# Patient Record
Sex: FEMALE | Race: WHITE | HISPANIC OR LATINO | Employment: FULL TIME | URBAN - METROPOLITAN AREA
[De-identification: names, ages, dates, MRNs, and addresses within clinical notes are randomized per-mention and may not be internally consistent; named-entity substitution may affect disease eponyms.]

---

## 2018-08-30 ENCOUNTER — APPOINTMENT (OUTPATIENT)
Dept: OTHER | Facility: IMAGING CENTER | Age: 28
End: 2018-08-30

## 2018-09-06 ENCOUNTER — HOSPITAL ENCOUNTER (INPATIENT)
Facility: MEDICAL CENTER | Age: 28
LOS: 3 days | End: 2018-09-09
Attending: OBSTETRICS & GYNECOLOGY | Admitting: OBSTETRICS & GYNECOLOGY
Payer: COMMERCIAL

## 2018-09-06 DIAGNOSIS — G89.18 POST-OP PAIN: ICD-10-CM

## 2018-09-06 LAB
BASOPHILS # BLD AUTO: 0.4 % (ref 0–1.8)
BASOPHILS # BLD: 0.02 K/UL (ref 0–0.12)
CRYSTALS AMN MICRO: NORMAL
EOSINOPHIL # BLD AUTO: 0.09 K/UL (ref 0–0.51)
EOSINOPHIL NFR BLD: 1.6 % (ref 0–6.9)
ERYTHROCYTE [DISTWIDTH] IN BLOOD BY AUTOMATED COUNT: 44.8 FL (ref 35.9–50)
HCT VFR BLD AUTO: 40.4 % (ref 37–47)
HGB BLD-MCNC: 14 G/DL (ref 12–16)
HOLDING TUBE BB 8507: NORMAL
IMM GRANULOCYTES # BLD AUTO: 0.01 K/UL (ref 0–0.11)
IMM GRANULOCYTES NFR BLD AUTO: 0.2 % (ref 0–0.9)
LYMPHOCYTES # BLD AUTO: 1.41 K/UL (ref 1–4.8)
LYMPHOCYTES NFR BLD: 24.7 % (ref 22–41)
MCH RBC QN AUTO: 31.5 PG (ref 27–33)
MCHC RBC AUTO-ENTMCNC: 34.7 G/DL (ref 33.6–35)
MCV RBC AUTO: 90.8 FL (ref 81.4–97.8)
MONOCYTES # BLD AUTO: 0.46 K/UL (ref 0–0.85)
MONOCYTES NFR BLD AUTO: 8.1 % (ref 0–13.4)
NEUTROPHILS # BLD AUTO: 3.72 K/UL (ref 2–7.15)
NEUTROPHILS NFR BLD: 65 % (ref 44–72)
NRBC # BLD AUTO: 0 K/UL
NRBC BLD-RTO: 0 /100 WBC
PLATELET # BLD AUTO: 155 K/UL (ref 164–446)
PMV BLD AUTO: 11.5 FL (ref 9–12.9)
RBC # BLD AUTO: 4.45 M/UL (ref 4.2–5.4)
WBC # BLD AUTO: 5.7 K/UL (ref 4.8–10.8)

## 2018-09-06 PROCEDURE — 700111 HCHG RX REV CODE 636 W/ 250 OVERRIDE (IP)

## 2018-09-06 PROCEDURE — 89060 EXAM SYNOVIAL FLUID CRYSTALS: CPT

## 2018-09-06 PROCEDURE — 700105 HCHG RX REV CODE 258

## 2018-09-06 PROCEDURE — 85025 COMPLETE CBC W/AUTO DIFF WBC: CPT

## 2018-09-06 PROCEDURE — 700101 HCHG RX REV CODE 250: Performed by: OBSTETRICS & GYNECOLOGY

## 2018-09-06 PROCEDURE — 770002 HCHG ROOM/CARE - OB PRIVATE (112)

## 2018-09-06 RX ORDER — SODIUM CHLORIDE, SODIUM LACTATE, POTASSIUM CHLORIDE, CALCIUM CHLORIDE 600; 310; 30; 20 MG/100ML; MG/100ML; MG/100ML; MG/100ML
INJECTION, SOLUTION INTRAVENOUS
Status: COMPLETED
Start: 2018-09-06 | End: 2018-09-07

## 2018-09-06 RX ORDER — SODIUM CHLORIDE, SODIUM LACTATE, POTASSIUM CHLORIDE, CALCIUM CHLORIDE 600; 310; 30; 20 MG/100ML; MG/100ML; MG/100ML; MG/100ML
INJECTION, SOLUTION INTRAVENOUS
Status: COMPLETED
Start: 2018-09-06 | End: 2018-09-06

## 2018-09-06 RX ORDER — SODIUM CHLORIDE, SODIUM LACTATE, POTASSIUM CHLORIDE, CALCIUM CHLORIDE 600; 310; 30; 20 MG/100ML; MG/100ML; MG/100ML; MG/100ML
INJECTION, SOLUTION INTRAVENOUS CONTINUOUS
Status: DISPENSED | OUTPATIENT
Start: 2018-09-06 | End: 2018-09-07

## 2018-09-06 RX ORDER — MISOPROSTOL 200 UG/1
800 TABLET ORAL
Status: DISCONTINUED | OUTPATIENT
Start: 2018-09-06 | End: 2018-09-07 | Stop reason: HOSPADM

## 2018-09-06 RX ADMIN — Medication 2 MILLI-UNITS/MIN: at 17:47

## 2018-09-06 RX ADMIN — DINOPROSTONE 5 MG: 20 SUPPOSITORY VAGINAL at 11:45

## 2018-09-06 RX ADMIN — SODIUM CHLORIDE, POTASSIUM CHLORIDE, SODIUM LACTATE AND CALCIUM CHLORIDE: 600; 310; 30; 20 INJECTION, SOLUTION INTRAVENOUS at 17:45

## 2018-09-06 ASSESSMENT — PATIENT HEALTH QUESTIONNAIRE - PHQ9
1. LITTLE INTEREST OR PLEASURE IN DOING THINGS: NOT AT ALL
2. FEELING DOWN, DEPRESSED, IRRITABLE, OR HOPELESS: NOT AT ALL
SUM OF ALL RESPONSES TO PHQ9 QUESTIONS 1 AND 2: 0

## 2018-09-06 ASSESSMENT — LIFESTYLE VARIABLES
ALCOHOL_USE: NO
EVER_SMOKED: NEVER

## 2018-09-06 NOTE — PROGRESS NOTES
1500: Report received from Kaity FERNANDEZ. POC discussed, assumed care.   1505: pt laying In bed, POC discussed, taught pt about monitoring. VS taken, WDL. Questions answered.

## 2018-09-06 NOTE — PROGRESS NOTES
EDC- , EGA- 39.2    1000- Pt arrived to L&D with c/o LOF X2 days, clear.  Reports some spotting of old dark brown blood, reports +FM.  SSE preformed, pooling of clear fluid noted.  Fern test collected and sent to lab.  SVE- 1/thick/-2.  1045- Report to Dr. Blas via phone.  Orders received to admit pt for labor.  1105- Report to REBECCA Lawrence.

## 2018-09-06 NOTE — H&P
"History and Physical      Sveta Coronel is a 28 y.o. female   at 38weeks 4days came with     Subjective:   positive  For negative   positive Feelsnegative   positive for LOF for 2 days   negative for vaginal bleeding.   positive for fetal movement    ROS: A comprehensive review of systems was negative.    No past medical history on file.  No past surgical history on file.  OB History    Para Term  AB Living   1             SAB TAB Ectopic Molar Multiple Live Births                    # Outcome Date GA Lbr Louis/2nd Weight Sex Delivery Anes PTL Lv   1 Current                 Social History     Social History   • Marital status: Single     Spouse name: N/A   • Number of children: N/A   • Years of education: N/A     Occupational History   • Not on file.     Social History Main Topics   • Smoking status: Not on file   • Smokeless tobacco: Not on file   • Alcohol use Not on file   • Drug use: Unknown   • Sexual activity: Not on file     Other Topics Concern   • Not on file     Social History Narrative   • No narrative on file     Allergies: Sulfa drugs    Current Facility-Administered Medications:   •  dinoprostone gel (PROSTAGLANDIN) 5 mg, 5 mg, Vaginal, Once, Braeden Blas M.D.    P[facility] with [facility] late transfer of care There are no active problems to display for this patient.              Objective:      Blood pressure 128/86, pulse 98, temperature 36.7 °C (98 °F), temperature source Temporal, height 1.6 m (5' 3\"), weight 69.4 kg (153 lb).    General:   no acute distress, alert, cooperative   Skin:   normal   HEENT:  PERRLA   Lungs:   CTA bilateral   Heart:   S1, S2 normal, no murmur, click, rub or gallop, regular rate and rhythm, brisk carotid upstroke without bruits, peripheral pulses very brisk, chest is clear without rales or wheezing, no pedal edema, no JVD, no hepatosplenomegaly   Abdomen:   gravid, NT   EFW:  3400gms.   Pelvis:  adequate with gynecoid pelvis   FHT:  142 " BPM   Uterine Size: S=D   Presentations: Cephalic   Cervix:     Dilation: 1cm    Effacement: 50%    Station:  -3    Consistency: Firm    Position: Posterior     Lab Review  Lab:     Recent Results (from the past 5880 hour(s))   Ferning if suspected rupture of membranes (ROM)    Collection Time: 09/06/18 10:15 AM   Result Value Ref Range    Fern Test On Amniotic Fluid see below Not present        Assessment:   Sveta Coronel at Unknown  Labor status:not in labor   Obstetrical history significant for There are no active problems to display for this patient.  .      Plan:     Admit to L&D  GBS negative  IOL with prostin gel.

## 2018-09-06 NOTE — PROGRESS NOTES
1115-report received from HUNG Cantrell RN, POC discussed, gel ordered  1145-gel placed, pt educated, verbalized understanding, iv started, labs drawn and sent, admission done  1300-up to walk and eat lunch  1430-back on monitors  1500-report given to JAQUELINE Winchester RN, POC discussed

## 2018-09-07 PROCEDURE — 700111 HCHG RX REV CODE 636 W/ 250 OVERRIDE (IP)

## 2018-09-07 PROCEDURE — 3E0P7VZ INTRODUCTION OF HORMONE INTO FEMALE REPRODUCTIVE, VIA NATURAL OR ARTIFICIAL OPENING: ICD-10-PCS | Performed by: OBSTETRICS & GYNECOLOGY

## 2018-09-07 PROCEDURE — 700102 HCHG RX REV CODE 250 W/ 637 OVERRIDE(OP): Performed by: ANESTHESIOLOGY

## 2018-09-07 PROCEDURE — 305385 HCHG SURGICAL SERVICES 1/4 HOUR: Performed by: OBSTETRICS & GYNECOLOGY

## 2018-09-07 PROCEDURE — 700105 HCHG RX REV CODE 258

## 2018-09-07 PROCEDURE — 700101 HCHG RX REV CODE 250

## 2018-09-07 PROCEDURE — 304964 HCHG RECOVERY ROOM TIME 1HR: Performed by: OBSTETRICS & GYNECOLOGY

## 2018-09-07 PROCEDURE — 700111 HCHG RX REV CODE 636 W/ 250 OVERRIDE (IP): Performed by: OBSTETRICS & GYNECOLOGY

## 2018-09-07 PROCEDURE — 306828 HCHG ANES-TIME GENERAL: Performed by: OBSTETRICS & GYNECOLOGY

## 2018-09-07 PROCEDURE — 700105 HCHG RX REV CODE 258: Performed by: OBSTETRICS & GYNECOLOGY

## 2018-09-07 PROCEDURE — 304966 HCHG RECOVERY SVSC TIME ADDL 1/2 HR: Performed by: OBSTETRICS & GYNECOLOGY

## 2018-09-07 PROCEDURE — 700102 HCHG RX REV CODE 250 W/ 637 OVERRIDE(OP)

## 2018-09-07 PROCEDURE — 303615 HCHG EPIDURAL/SPINAL ANESTHESIA FOR LABOR

## 2018-09-07 PROCEDURE — 59514 CESAREAN DELIVERY ONLY: CPT

## 2018-09-07 PROCEDURE — 700111 HCHG RX REV CODE 636 W/ 250 OVERRIDE (IP): Performed by: ANESTHESIOLOGY

## 2018-09-07 PROCEDURE — A9270 NON-COVERED ITEM OR SERVICE: HCPCS | Performed by: ANESTHESIOLOGY

## 2018-09-07 PROCEDURE — 770002 HCHG ROOM/CARE - OB PRIVATE (112)

## 2018-09-07 PROCEDURE — 3E033VJ INTRODUCTION OF OTHER HORMONE INTO PERIPHERAL VEIN, PERCUTANEOUS APPROACH: ICD-10-PCS | Performed by: OBSTETRICS & GYNECOLOGY

## 2018-09-07 PROCEDURE — 88307 TISSUE EXAM BY PATHOLOGIST: CPT

## 2018-09-07 PROCEDURE — 36415 COLL VENOUS BLD VENIPUNCTURE: CPT

## 2018-09-07 RX ORDER — ROPIVACAINE HYDROCHLORIDE 2 MG/ML
INJECTION, SOLUTION EPIDURAL; INFILTRATION; PERINEURAL
Status: COMPLETED
Start: 2018-09-07 | End: 2018-09-07

## 2018-09-07 RX ORDER — OXYCODONE HYDROCHLORIDE AND ACETAMINOPHEN 5; 325 MG/1; MG/1
1 TABLET ORAL EVERY 4 HOURS PRN
Status: DISPENSED | OUTPATIENT
Start: 2018-09-07 | End: 2018-09-08

## 2018-09-07 RX ORDER — CITRIC ACID/SODIUM CITRATE 334-500MG
SOLUTION, ORAL ORAL
Status: COMPLETED
Start: 2018-09-07 | End: 2018-09-07

## 2018-09-07 RX ORDER — MISOPROSTOL 200 UG/1
600 TABLET ORAL
Status: DISCONTINUED | OUTPATIENT
Start: 2018-09-07 | End: 2018-09-09 | Stop reason: HOSPADM

## 2018-09-07 RX ORDER — ONDANSETRON 2 MG/ML
4 INJECTION INTRAMUSCULAR; INTRAVENOUS EVERY 6 HOURS PRN
Status: DISCONTINUED | OUTPATIENT
Start: 2018-09-07 | End: 2018-09-08

## 2018-09-07 RX ORDER — CITRIC ACID/SODIUM CITRATE 334-500MG
30 SOLUTION, ORAL ORAL ONCE
Status: COMPLETED | OUTPATIENT
Start: 2018-09-07 | End: 2018-09-07

## 2018-09-07 RX ORDER — ONDANSETRON 2 MG/ML
4 INJECTION INTRAMUSCULAR; INTRAVENOUS EVERY 6 HOURS PRN
Status: DISCONTINUED | OUTPATIENT
Start: 2018-09-07 | End: 2018-09-07 | Stop reason: HOSPADM

## 2018-09-07 RX ORDER — OXYCODONE HYDROCHLORIDE AND ACETAMINOPHEN 5; 325 MG/1; MG/1
2 TABLET ORAL EVERY 4 HOURS PRN
Status: ACTIVE | OUTPATIENT
Start: 2018-09-07 | End: 2018-09-08

## 2018-09-07 RX ORDER — OXYCODONE AND ACETAMINOPHEN 10; 325 MG/1; MG/1
1 TABLET ORAL EVERY 4 HOURS PRN
Status: DISCONTINUED | OUTPATIENT
Start: 2018-09-07 | End: 2018-09-07 | Stop reason: HOSPADM

## 2018-09-07 RX ORDER — ONDANSETRON 4 MG/1
4 TABLET, ORALLY DISINTEGRATING ORAL EVERY 6 HOURS PRN
Status: DISCONTINUED | OUTPATIENT
Start: 2018-09-07 | End: 2018-09-07 | Stop reason: HOSPADM

## 2018-09-07 RX ORDER — DIPHENHYDRAMINE HYDROCHLORIDE 50 MG/ML
12.5 INJECTION INTRAMUSCULAR; INTRAVENOUS EVERY 6 HOURS PRN
Status: DISCONTINUED | OUTPATIENT
Start: 2018-09-07 | End: 2018-09-08

## 2018-09-07 RX ORDER — KETOROLAC TROMETHAMINE 30 MG/ML
30 INJECTION, SOLUTION INTRAMUSCULAR; INTRAVENOUS EVERY 6 HOURS
Status: DISPENSED | OUTPATIENT
Start: 2018-09-07 | End: 2018-09-08

## 2018-09-07 RX ORDER — SIMETHICONE 80 MG
80 TABLET,CHEWABLE ORAL 4 TIMES DAILY PRN
Status: DISCONTINUED | OUTPATIENT
Start: 2018-09-07 | End: 2018-09-09 | Stop reason: HOSPADM

## 2018-09-07 RX ORDER — METOCLOPRAMIDE HYDROCHLORIDE 5 MG/ML
INJECTION INTRAMUSCULAR; INTRAVENOUS
Status: COMPLETED
Start: 2018-09-07 | End: 2018-09-07

## 2018-09-07 RX ORDER — SODIUM CHLORIDE, SODIUM LACTATE, POTASSIUM CHLORIDE, CALCIUM CHLORIDE 600; 310; 30; 20 MG/100ML; MG/100ML; MG/100ML; MG/100ML
INJECTION, SOLUTION INTRAVENOUS PRN
Status: DISCONTINUED | OUTPATIENT
Start: 2018-09-07 | End: 2018-09-09 | Stop reason: HOSPADM

## 2018-09-07 RX ORDER — DEXTROSE, SODIUM CHLORIDE, SODIUM LACTATE, POTASSIUM CHLORIDE, AND CALCIUM CHLORIDE 5; .6; .31; .03; .02 G/100ML; G/100ML; G/100ML; G/100ML; G/100ML
INJECTION, SOLUTION INTRAVENOUS CONTINUOUS
Status: DISCONTINUED | OUTPATIENT
Start: 2018-09-07 | End: 2018-09-09 | Stop reason: HOSPADM

## 2018-09-07 RX ORDER — VITAMIN A ACETATE, BETA CAROTENE, ASCORBIC ACID, CHOLECALCIFEROL, .ALPHA.-TOCOPHEROL ACETATE, DL-, THIAMINE MONONITRATE, RIBOFLAVIN, NIACINAMIDE, PYRIDOXINE HYDROCHLORIDE, FOLIC ACID, CYANOCOBALAMIN, CALCIUM CARBONATE, FERROUS FUMARATE, ZINC OXIDE, CUPRIC OXIDE 3080; 12; 120; 400; 1; 1.84; 3; 20; 22; 920; 25; 200; 27; 10; 2 [IU]/1; UG/1; MG/1; [IU]/1; MG/1; MG/1; MG/1; MG/1; MG/1; [IU]/1; MG/1; MG/1; MG/1; MG/1; MG/1
1 TABLET, FILM COATED ORAL EVERY MORNING
Status: DISCONTINUED | OUTPATIENT
Start: 2018-09-08 | End: 2018-09-09 | Stop reason: HOSPADM

## 2018-09-07 RX ORDER — MEPERIDINE HYDROCHLORIDE 25 MG/ML
INJECTION INTRAMUSCULAR; INTRAVENOUS; SUBCUTANEOUS
Status: COMPLETED
Start: 2018-09-07 | End: 2018-09-07

## 2018-09-07 RX ORDER — IBUPROFEN 600 MG/1
600 TABLET ORAL EVERY 6 HOURS PRN
Status: DISCONTINUED | OUTPATIENT
Start: 2018-09-07 | End: 2018-09-07 | Stop reason: HOSPADM

## 2018-09-07 RX ORDER — SODIUM CHLORIDE, SODIUM GLUCONATE, SODIUM ACETATE, POTASSIUM CHLORIDE AND MAGNESIUM CHLORIDE 526; 502; 368; 37; 30 MG/100ML; MG/100ML; MG/100ML; MG/100ML; MG/100ML
1500 INJECTION, SOLUTION INTRAVENOUS ONCE
Status: COMPLETED | OUTPATIENT
Start: 2018-09-07 | End: 2018-09-07

## 2018-09-07 RX ORDER — SODIUM CHLORIDE, SODIUM GLUCONATE, SODIUM ACETATE, POTASSIUM CHLORIDE AND MAGNESIUM CHLORIDE 526; 502; 368; 37; 30 MG/100ML; MG/100ML; MG/100ML; MG/100ML; MG/100ML
INJECTION, SOLUTION INTRAVENOUS
Status: COMPLETED
Start: 2018-09-07 | End: 2018-09-07

## 2018-09-07 RX ORDER — METOCLOPRAMIDE HYDROCHLORIDE 5 MG/ML
10 INJECTION INTRAMUSCULAR; INTRAVENOUS ONCE
Status: COMPLETED | OUTPATIENT
Start: 2018-09-07 | End: 2018-09-07

## 2018-09-07 RX ORDER — OXYCODONE HYDROCHLORIDE AND ACETAMINOPHEN 5; 325 MG/1; MG/1
1 TABLET ORAL EVERY 4 HOURS PRN
Status: DISCONTINUED | OUTPATIENT
Start: 2018-09-07 | End: 2018-09-07 | Stop reason: HOSPADM

## 2018-09-07 RX ADMIN — ONDANSETRON 4 MG: 2 INJECTION INTRAMUSCULAR; INTRAVENOUS at 17:23

## 2018-09-07 RX ADMIN — METOCLOPRAMIDE HYDROCHLORIDE 10 MG: 5 INJECTION INTRAMUSCULAR; INTRAVENOUS at 17:20

## 2018-09-07 RX ADMIN — SODIUM CHLORIDE, SODIUM LACTATE, POTASSIUM CHLORIDE, CALCIUM CHLORIDE AND DEXTROSE MONOHYDRATE: 5; 600; 310; 30; 20 INJECTION, SOLUTION INTRAVENOUS at 15:39

## 2018-09-07 RX ADMIN — SODIUM CHLORIDE, SODIUM GLUCONATE, SODIUM ACETATE, POTASSIUM CHLORIDE AND MAGNESIUM CHLORIDE 1000 ML: 526; 502; 368; 37; 30 INJECTION, SOLUTION INTRAVENOUS at 17:23

## 2018-09-07 RX ADMIN — ROPIVACAINE HYDROCHLORIDE 100 ML: 2 INJECTION, SOLUTION EPIDURAL; INFILTRATION at 08:56

## 2018-09-07 RX ADMIN — Medication 30 ML: at 17:24

## 2018-09-07 RX ADMIN — ROPIVACAINE HYDROCHLORIDE 100 ML: 2 INJECTION, SOLUTION EPIDURAL; INFILTRATION at 00:48

## 2018-09-07 RX ADMIN — SODIUM CITRATE AND CITRIC ACID MONOHYDRATE 30 ML: 500; 334 SOLUTION ORAL at 17:24

## 2018-09-07 RX ADMIN — METOCLOPRAMIDE 10 MG: 5 INJECTION, SOLUTION INTRAMUSCULAR; INTRAVENOUS at 17:20

## 2018-09-07 RX ADMIN — SODIUM CHLORIDE, SODIUM LACTATE, POTASSIUM CHLORIDE, CALCIUM CHLORIDE AND DEXTROSE MONOHYDRATE: 5; 600; 310; 30; 20 INJECTION, SOLUTION INTRAVENOUS at 07:39

## 2018-09-07 RX ADMIN — FAMOTIDINE 20 MG: 10 INJECTION, SOLUTION INTRAVENOUS at 17:41

## 2018-09-07 RX ADMIN — KETOROLAC TROMETHAMINE 30 MG: 30 INJECTION, SOLUTION INTRAMUSCULAR; INTRAVENOUS at 23:58

## 2018-09-07 RX ADMIN — SODIUM CHLORIDE, POTASSIUM CHLORIDE, SODIUM LACTATE AND CALCIUM CHLORIDE 1000 ML: 600; 310; 30; 20 INJECTION, SOLUTION INTRAVENOUS at 00:44

## 2018-09-07 RX ADMIN — SODIUM CHLORIDE, POTASSIUM CHLORIDE, SODIUM LACTATE AND CALCIUM CHLORIDE: 600; 310; 30; 20 INJECTION, SOLUTION INTRAVENOUS at 01:15

## 2018-09-07 RX ADMIN — MEPERIDINE HYDROCHLORIDE 12.5 MG: 25 INJECTION INTRAMUSCULAR; INTRAVENOUS; SUBCUTANEOUS at 18:29

## 2018-09-07 RX ADMIN — OXYCODONE AND ACETAMINOPHEN 1 TABLET: 5; 325 TABLET ORAL at 20:38

## 2018-09-07 ASSESSMENT — PAIN SCALES - GENERAL
PAINLEVEL_OUTOF10: 5
PAINLEVEL_OUTOF10: 2
PAINLEVEL_OUTOF10: 2
PAINLEVEL_OUTOF10: 0

## 2018-09-07 NOTE — CARE PLAN
Problem: Knowledge Deficit  Goal: Patient/Support person demonstrates understanding regarding the progression of labor, available options and participates in decision-making process    Intervention: Instruct patient/support person in basic interpretation of fetal monitor  Instructed pt and family in basic interpretation of fetal monitors, verbalized understanding      Problem: Pain  Goal: Alleviation of Pain or a reduction in pain to the patient's comfort goal    Intervention: Pain Management - Epidural/Spinal  Received an epidural for pain management, adequate relief

## 2018-09-07 NOTE — PROGRESS NOTES
0700-report received from SKYLER Roe RN, care assumed, POC discussed  0720-TC Dr Blas, report given, order for D5LR ordered  0730-D5LR hung  0830-Dr Blas here, SVE 6/100/-2, continue with POC  1552-started pushing, was able reduce lip  1650-pt states that she would like to stop pushing and have a c/s, Dr Blas on her way up   1700-Dr Blas in room, assessed pt, c/s called, pt consented and readied for surgery  1728-to OR

## 2018-09-07 NOTE — PROGRESS NOTES
S: Pt comfortable with epidural.    O:  Vitals:    18 0600 18 0615 18 0645 18 0700   BP: 123/78 120/80 145/82 121/77   Pulse: 79 89 90 82   Resp:       Temp:       TempSrc:       SpO2:       Weight:       Height:           IUPC -  Regular contractions adequate MVU  FSE - 140s, moderate variability, + accels  Cx - 6/90/-3  A/P:27 y/o  @ 38weeks 4dayse    in active labor.  Reassuring fetal status await vaginal delivery.

## 2018-09-07 NOTE — PROGRESS NOTES
1700 Report rcvd from Elba and Emily, RNs, at bedside. POC discussed, pt care assumed. Dr. Blas at bedside. SVE. Orders rcvd.   1747 Pitocin started via pump.   1900 Report to Franc, RNs, at bedside.

## 2018-09-07 NOTE — PROGRESS NOTES
1900: report received from dinh burkett, assumed care of patient    2345: rn at , sve 3-4 cm.  Pt request epidural.    0035: epidural placed by dr. Landa, no complications    0430: sve 5 cm    0630: rn at , sve 5-6/90/-1. Pt moved to left side.  Pt denies needs    0645: report to arlen burkett

## 2018-09-07 NOTE — CARE PLAN
Problem: Knowledge Deficit  Goal: Patient/Support person demonstrates understanding regarding the progression of labor, available options and participates in decision-making process    Intervention: Discuss options for care during the labor/delivery process  Labor Ball provided for pt. Pt educated on using labor ball and ambulating/moving as able to help with labor progression.      Problem: Pain  Goal: Alleviation of Pain or a reduction in pain to the patient's comfort goal  Outcome: PROGRESSING AS EXPECTED  Pt tolerating pain at this time. Goal for Epidural as labor progresses. Pt comfortable at this time.

## 2018-09-08 LAB
ERYTHROCYTE [DISTWIDTH] IN BLOOD BY AUTOMATED COUNT: 46.3 FL (ref 35.9–50)
HCT VFR BLD AUTO: 35.1 % (ref 37–47)
HGB BLD-MCNC: 11.6 G/DL (ref 12–16)
MCH RBC QN AUTO: 31.3 PG (ref 27–33)
MCHC RBC AUTO-ENTMCNC: 33 G/DL (ref 33.6–35)
MCV RBC AUTO: 94.6 FL (ref 81.4–97.8)
PLATELET # BLD AUTO: 127 K/UL (ref 164–446)
PMV BLD AUTO: 11.1 FL (ref 9–12.9)
RBC # BLD AUTO: 3.71 M/UL (ref 4.2–5.4)
WBC # BLD AUTO: 14.4 K/UL (ref 4.8–10.8)

## 2018-09-08 PROCEDURE — 700102 HCHG RX REV CODE 250 W/ 637 OVERRIDE(OP): Performed by: ANESTHESIOLOGY

## 2018-09-08 PROCEDURE — A9270 NON-COVERED ITEM OR SERVICE: HCPCS | Performed by: ANESTHESIOLOGY

## 2018-09-08 PROCEDURE — 700102 HCHG RX REV CODE 250 W/ 637 OVERRIDE(OP): Performed by: OBSTETRICS & GYNECOLOGY

## 2018-09-08 PROCEDURE — 770002 HCHG ROOM/CARE - OB PRIVATE (112)

## 2018-09-08 PROCEDURE — 36415 COLL VENOUS BLD VENIPUNCTURE: CPT

## 2018-09-08 PROCEDURE — A9270 NON-COVERED ITEM OR SERVICE: HCPCS | Performed by: OBSTETRICS & GYNECOLOGY

## 2018-09-08 PROCEDURE — 85027 COMPLETE CBC AUTOMATED: CPT

## 2018-09-08 PROCEDURE — 700111 HCHG RX REV CODE 636 W/ 250 OVERRIDE (IP): Performed by: ANESTHESIOLOGY

## 2018-09-08 RX ORDER — OXYCODONE HYDROCHLORIDE AND ACETAMINOPHEN 5; 325 MG/1; MG/1
2 TABLET ORAL EVERY 4 HOURS PRN
Status: DISCONTINUED | OUTPATIENT
Start: 2018-09-08 | End: 2018-09-09 | Stop reason: HOSPADM

## 2018-09-08 RX ORDER — ACETAMINOPHEN 325 MG/1
325 TABLET ORAL EVERY 4 HOURS PRN
Status: DISCONTINUED | OUTPATIENT
Start: 2018-09-08 | End: 2018-09-09 | Stop reason: HOSPADM

## 2018-09-08 RX ORDER — ONDANSETRON 4 MG/1
4 TABLET, ORALLY DISINTEGRATING ORAL EVERY 6 HOURS PRN
Status: DISCONTINUED | OUTPATIENT
Start: 2018-09-08 | End: 2018-09-09 | Stop reason: HOSPADM

## 2018-09-08 RX ORDER — ONDANSETRON 2 MG/ML
4 INJECTION INTRAMUSCULAR; INTRAVENOUS EVERY 6 HOURS PRN
Status: DISCONTINUED | OUTPATIENT
Start: 2018-09-08 | End: 2018-09-09 | Stop reason: HOSPADM

## 2018-09-08 RX ORDER — OXYCODONE HYDROCHLORIDE AND ACETAMINOPHEN 5; 325 MG/1; MG/1
1 TABLET ORAL EVERY 4 HOURS PRN
Status: DISCONTINUED | OUTPATIENT
Start: 2018-09-08 | End: 2018-09-09 | Stop reason: HOSPADM

## 2018-09-08 RX ADMIN — OXYCODONE AND ACETAMINOPHEN 1 TABLET: 5; 325 TABLET ORAL at 00:58

## 2018-09-08 RX ADMIN — OXYCODONE AND ACETAMINOPHEN 1 TABLET: 5; 325 TABLET ORAL at 09:46

## 2018-09-08 RX ADMIN — OXYCODONE AND ACETAMINOPHEN 1 TABLET: 5; 325 TABLET ORAL at 04:58

## 2018-09-08 RX ADMIN — SIMETHICONE 80 MG: 80 TABLET, CHEWABLE ORAL at 00:58

## 2018-09-08 RX ADMIN — OXYCODONE AND ACETAMINOPHEN 1 TABLET: 5; 325 TABLET ORAL at 18:35

## 2018-09-08 RX ADMIN — KETOROLAC TROMETHAMINE 30 MG: 30 INJECTION, SOLUTION INTRAMUSCULAR; INTRAVENOUS at 10:10

## 2018-09-08 RX ADMIN — OXYCODONE HYDROCHLORIDE AND ACETAMINOPHEN 2 TABLET: 5; 325 TABLET ORAL at 22:33

## 2018-09-08 RX ADMIN — OXYCODONE AND ACETAMINOPHEN 1 TABLET: 5; 325 TABLET ORAL at 14:22

## 2018-09-08 RX ADMIN — KETOROLAC TROMETHAMINE 30 MG: 30 INJECTION, SOLUTION INTRAMUSCULAR; INTRAVENOUS at 06:00

## 2018-09-08 ASSESSMENT — PAIN SCALES - GENERAL
PAINLEVEL_OUTOF10: 3
PAINLEVEL_OUTOF10: 2
PAINLEVEL_OUTOF10: 2
PAINLEVEL_OUTOF10: 7
PAINLEVEL_OUTOF10: 4
PAINLEVEL_OUTOF10: 6
PAINLEVEL_OUTOF10: 6
PAINLEVEL_OUTOF10: 2
PAINLEVEL_OUTOF10: 5
PAINLEVEL_OUTOF10: 5
PAINLEVEL_OUTOF10: 2

## 2018-09-08 ASSESSMENT — PATIENT HEALTH QUESTIONNAIRE - PHQ9
1. LITTLE INTEREST OR PLEASURE IN DOING THINGS: NOT AT ALL
SUM OF ALL RESPONSES TO PHQ9 QUESTIONS 1 AND 2: 0
2. FEELING DOWN, DEPRESSED, IRRITABLE, OR HOPELESS: NOT AT ALL
SUM OF ALL RESPONSES TO PHQ9 QUESTIONS 1 AND 2: 0
2. FEELING DOWN, DEPRESSED, IRRITABLE, OR HOPELESS: NOT AT ALL
1. LITTLE INTEREST OR PLEASURE IN DOING THINGS: NOT AT ALL

## 2018-09-08 NOTE — OP REPORT
DATE OF SERVICE:  2018    HISTORY OF PRESENT ILLNESS:  The patient is a 28-year-old  2, para ____   at 38 weeks and 5 days of gestational age, came with history of ruptured   membranes.  Following prostin gel, Pitocin was started, augmentation was   continued.  Got an epidural.  Patient progressed steadily in latent phase of   labor and also active phase of labor.  Due to prolonged second stage and   suspected cephalopelvic disproportion, after discussing the risks, benefits,   and alternatives, patient chose to have an emergency .  Risks,   benefits, alternatives were discussed.  Patient wants to go ahead with the   procedure.    PREOPERATIVE DIAGNOSES:  Term gestation with prolonged rupture of membranes   with cephalopelvic disproportion with prolonged second stage of labor.    POSTOPERATIVE DIAGNOSES:  Term gestation with prolonged rupture of membranes   with cephalopelvic disproportion with prolonged second stage of labor.    PROCEDURE PERFORMED:  Primary low transverse .    SURGEON:  Braeden Blas MD    ASSISTANT:  Joceline Tello MD.    ANESTHESIOLOGIST:  Dr. Tobar.    ANESTHESIA:  Epidural.    PROCEDURE:  After consenting the patient, patient was taken to the OR.    Bladder was Barney catheterized, was found to be adequate.  Patient was prepped   and draped in a normal sterile fashion in a supine position with a leftward   tilt.  With a Pfannenstiel skin incision, the scalpel carried down to the   underlying fascia.  The fascia was incised in the midline and extended   laterally using curved Garcia scissors.  Bleeders were coagulated.  Lower flap   of the fascial incision was held with Kocher clamps, elevated, and    from the underlying rectus muscles.  Similarly, the upper flap was held with   Kocher's and  from the underlying rectus muscles by sharp and blunt   dissection.  The peritoneum was identified and held up with pickups and the   peritoneal  cavity was entered sharply; dissection extended superiorly and   inferiorly with good visualization of the bladder.  A rectus retractor was   inserted and held in place.  Lower uterine segment was identified.  Peritoneum   was picked up and incised, and bladder flap was created digitally.  Lower   uterine segment was incised in a transverse fashion using the scalpel and   extended using manual traction.  Clear fluid was noted.  Infant was   subsequently delivered atraumatically.  Nose and mouth were bulb suctioned.    Cord was clamped and cut.  The infant was subsequently handed to the awaiting   nursery nurse.  Baby cried immediately.  Subsequently, the placenta was   removed spontaneously intact with a 3-vessel cord noted.  Uterus was cleared   off all clots and debris.  Massage was made because of a fluffy uterus and   eventually uterus was well contracted and retracted.  Uterine incision was   repaired in 2 layers using 0 chromic sutures.  Hemostasis was visualized.  The   pelvic cavity was copiously irrigated.  The uterine incision was reexamined   and noted to be hemostatic.  Peritoneum was closed with 3-0 Vicryl.  Rectus   muscles were approximated in the midline.  Fascia was closed with 0 Vicryl.    Subcutaneous layer was closed with 3-0 plain gut and the skin was closed with   4-0 Vicryl.  Sponge, lap, and instrument counts were correct x2.  Patient was   stable at the completion of the procedure and was subsequently transferred to   the recovery room in stable condition.       ____________________________________     MD GALO Romano / SUSANA    DD:  09/07/2018 18:26:33  DT:  09/07/2018 19:45:14    D#:  [id number]  Job#:  [id number]

## 2018-09-08 NOTE — OR SURGEON
Immediate Post OP Note    PreOp Diagnosis: term. ROM   prolonged active and second stage   CPD.      PostOp Diagnosis: same.    Procedure(s):  PRIMARY C SECTION - Wound Class: Clean Contaminated    Surgeon(s):  JOSE Romano M.D.    Anesthesiologist/Type of Anesthesia:  Anesthesiologist: Jose Daniel Tobar M.D./Spinal    Surgical Staff:  Circulator: Kaity Matos R.N.  Scrub Person: Katherin Alcantara  L&D Baby  Nurse: Penny Walls R.N.    Specimens removed if any:  Cord gases.   palcenta for pathology.    Estimated Blood Loss: 700ml.    Findings: normal uterus edematous bladder.    Complications: none.        9/7/2018 6:20 PM Braeden Blas M.D.

## 2018-09-08 NOTE — PROGRESS NOTES
S: Pt is in mild distrress is  under epidural low dose    O:  Vitals:    18 1333 18 1353 18 1413 18 1433   BP: 129/87 115/69 111/69 113/71   Pulse: 72 69 75 77   Resp:       Temp:       TempSrc:       SpO2:       Weight:       Height:           IUPC - regular contractions , adequate MVU  FSE - 140s, moderate variability, + accels  Cx - fullydilated , caput, station -2. No descent with contractions and pushing.  A/P:28  [id number]  @ 38weeks 5days  With ROM  Pt has been  Pitocin after prostin gel    prolonged latent phase ,  Prolonged active phace.   prolonged second stage of labor.   CPD.  Plan for primary C/S.  Discussed with the patient indications for  delivery. The patient voiced understanding of indications for  section at this time.    Discussed with the patient the risks of  delivery. The risks include bleeding, infection, transfusion, emergency hysterectomy to control bleeding, damage to surrounding organs (bowel, bladder, ureters, nerves, vessels), need for repair or future surgery, fetal injury, unexpected pathology, anesthesia risks, and rarely death.  I also discussed with the patient the risk of wound infection, wound breakdown, and scarring. We discussed that these risks are greater in people that have diabetes or obesity.    The patient had the opportunity to ask questions regarding the procedure. All questions answered to the patient's satisfaction. Plan to proceed with  delivery.

## 2018-09-08 NOTE — CARE PLAN
Problem: Altered physiologic condition related to postoperative  delivery  Goal: Patient physiologically stable as evidenced by normal lochia, palpable uterine involution and vital signs within normal limits  Outcome: PROGRESSING AS EXPECTED  Fundal massaged with light bleeding    Problem: Alteration in comfort related to surgical incision and/or after birth pains  Goal: Patient is able to ambulate, care for self and infant with acceptable pain level  Outcome: PROGRESSING AS EXPECTED  Pain controlled

## 2018-09-08 NOTE — PROGRESS NOTES
1900: report received from Kaity Matos RN. POC dicussed care assumed. Pt currently comfortably laying in bed.   1927: pt transferred to postpartum. Report given to Omayra FERNANDEZ.

## 2018-09-08 NOTE — PROGRESS NOTES
1945 Admitted from L and D per [facility] with mora catheter connected to urine bag draining to clear urine output, with sequential stocking bilaterally, Pt oriented to room instructed her to call if she needs her nurse, Assessment done wound clean dry and intact, pt denies pain at this time, Encourage early mobilization, Admission care rendered.

## 2018-09-08 NOTE — CONSULTS
Lactation note:  Initial visit.  Infant is in NBN for observation.Discussed normal  behaviors and normal course of breastfeeding at 12-24-48-72 hours, and what to expect. Discussed importance of offering breast every 2-3 hours, and even if infant shows no interest, can do hand expression into infant's lips. Encouraged to continue doing skin to skin. Discussed signs of a good latch, voiding and stooling patterns, feeding cues, stomach size, and importance of establishing milk supply with frequency of feedings.  New Beginning booklet given, and breastfeeding content reviewed.   Plan for tonight is to continue to offer breast first, if not latching well, can hand express colostrum, and refeed by spoon.        Observed MOB latching infant left side with cradle hold. Infant with flanged lips, and nutritive sucking noted. MOB denies pain with latch. Encouraged her to continue to work on deep latch, and skin 2 skin, with hand expression.     Information given regarding Lactation connection, and number to call, invited to breastfeeding circles  as well.    Encouraged to call for support as needed.

## 2018-09-08 NOTE — PROGRESS NOTES
"Lactation Note:    Met with MOB for a lactation follow up visit.  MOB delivered her first baby yesterday, 09/07/18, at 1750.  Infant is approximately 20.5 hours old and just returned from the NBN.  Assistance offered with breastfeeding, but MOB declined.  MOB stated just attempted to breastfeed infant, but infant would not open her mouth to feed.  Infant currently skin to skin with MOB.  MOB encouraged to try to latch infant again in 30 minutes, but if infant does not latch onto breast, then MOB should hand express colostrum into colostrum catcher and feed back expressed breast milk to infant via syringe.  MOB stated infant has latched onto her breast at least 4 times since birth and has fed from between 10-30 minutes each time.    MOB stated was told by NBN RN that she has bruises to both tips of her nipples.  MOB stated she felt no pain when she was breastfeeding infant.  MOB encouraged to watch \"Latching On\" video on the [employer] system and to call for assistance with latch, if needed.    MOB aware of the outpatient lactation assistance available to her through the Lactation Connection.    MOB stated is applying organic nipple cream that she brought from home to both breasts and is wiping cream off of breasts prior to feeding infant.    MOB verbalized understanding of all information provided to her and denied having any further questions at this time.     Breastfeeding POC:    Encouraged MOB to feed infant on demand per hunger cues and not let infant go more than 3 hours without a feed.  If infant still has not latched onto her breast at 1750 tonight, then MOB is aware that a feeding plan will be implemented that would include supplementing infant with DBM or formula and pumping.  MOB aware that if infant does not latch onto her breast between now and 1750, then she is to hand express colostrum onto a spoon and feed back any and all EBM to infant via spoon or syringe.      "

## 2018-09-09 VITALS
RESPIRATION RATE: 18 BRPM | TEMPERATURE: 97.9 F | HEART RATE: 96 BPM | OXYGEN SATURATION: 95 % | SYSTOLIC BLOOD PRESSURE: 129 MMHG | DIASTOLIC BLOOD PRESSURE: 92 MMHG | WEIGHT: 153 LBS | HEIGHT: 63 IN | BODY MASS INDEX: 27.11 KG/M2

## 2018-09-09 PROCEDURE — A9270 NON-COVERED ITEM OR SERVICE: HCPCS | Performed by: OBSTETRICS & GYNECOLOGY

## 2018-09-09 PROCEDURE — 700102 HCHG RX REV CODE 250 W/ 637 OVERRIDE(OP): Performed by: OBSTETRICS & GYNECOLOGY

## 2018-09-09 PROCEDURE — 700112 HCHG RX REV CODE 229: Performed by: OBSTETRICS & GYNECOLOGY

## 2018-09-09 RX ORDER — ONDANSETRON 4 MG/1
4 TABLET, ORALLY DISINTEGRATING ORAL EVERY 6 HOURS PRN
Qty: 20 TAB | Refills: 1 | Status: SHIPPED | OUTPATIENT
Start: 2018-09-09 | End: 2020-09-01

## 2018-09-09 RX ORDER — OXYCODONE HYDROCHLORIDE AND ACETAMINOPHEN 5; 325 MG/1; MG/1
1 TABLET ORAL EVERY 4 HOURS PRN
Qty: 15 TAB | Refills: 0 | Status: SHIPPED | OUTPATIENT
Start: 2018-09-09 | End: 2018-09-14

## 2018-09-09 RX ORDER — DOCUSATE SODIUM 100 MG/1
100 CAPSULE, LIQUID FILLED ORAL
Status: DISCONTINUED | OUTPATIENT
Start: 2018-09-09 | End: 2018-09-09 | Stop reason: HOSPADM

## 2018-09-09 RX ORDER — IBUPROFEN 600 MG/1
600 TABLET ORAL EVERY 6 HOURS PRN
Qty: 30 TAB | Refills: 1 | Status: SHIPPED | OUTPATIENT
Start: 2018-09-09 | End: 2020-09-01

## 2018-09-09 RX ADMIN — Medication 1 TABLET: at 06:12

## 2018-09-09 RX ADMIN — OXYCODONE HYDROCHLORIDE AND ACETAMINOPHEN 1 TABLET: 5; 325 TABLET ORAL at 07:51

## 2018-09-09 RX ADMIN — OXYCODONE HYDROCHLORIDE AND ACETAMINOPHEN 1 TABLET: 5; 325 TABLET ORAL at 05:12

## 2018-09-09 RX ADMIN — OXYCODONE HYDROCHLORIDE AND ACETAMINOPHEN 2 TABLET: 5; 325 TABLET ORAL at 11:52

## 2018-09-09 RX ADMIN — DOCUSATE SODIUM 100 MG: 100 CAPSULE, LIQUID FILLED ORAL at 09:41

## 2018-09-09 ASSESSMENT — PAIN SCALES - GENERAL
PAINLEVEL_OUTOF10: 2
PAINLEVEL_OUTOF10: 5

## 2018-09-09 NOTE — PROGRESS NOTES
"Lactation Note:    Met with MOB for a lactation follow up visit.  NOC primary RN reported infant was cluster feeding last night.  Assistance offered with breastfeeding and MOB accepted.  Assisted with putting infant to MOB's left breast in cross cradle position.  Infant eager to latch onto MOB's breast and is showing hunger cues.  During latch attempt, infant noted to be latching onto the tip of nipple and MOB would attempt to latch infant deeper onto breast while infant was suckling at the breast.  It appeared as if infant was \"scooting\" deeper onto breast.  Encouraged MOB to release latch and demonstrated to MOB on how to get a deeper latch by wedging her breast and by stroking her nipple down infant's nose to infant's chin to illicit a wide mouth response from infant.  Latch successful and deep.  MOB stated had no pain with latch but did notice that her nipples were becoming sore and more \"sensitive\" when infant was feeding at the breast.  MOB had a small blister and minimal bruising at the top right of right nipple and minimal bruising at left nipple.    MOB encouraged to continue to feed infant on demand per feeding cues and at least 8-12 times in a 24 hour period.  Advised MOB not to allow infant to go more than 3 hours without a feed.    Discussed the difference between cluster feeding and dehydration.    Discussed signs of successful milk transfer.    MOB encouraged to apply EBM to sore and damaged nipples and allow to air dry and then to apply organic nipple cream.      MOB has WIC and is seen at the office on [address] in [place], [place].  MOB made aware of the outpatient lactation assistance available to her through WIC and the Lactation Connection.  MOB invited to attend Breastfeeding [facility].     MOB verbalized understanding of all information provided to her and denied having any further questions at this time.  Encouraged MOB to call for lactation assistance as needed.  "

## 2018-09-09 NOTE — PROGRESS NOTES
Patient assessment completed, see flow sheets. Discussed plan of care and pain control. Patient ambulating independently in room, reinforced education of call light use. Patient denied needs.

## 2018-09-09 NOTE — PROGRESS NOTES
Discharge to home with family in stable condition. Discharge instructions reviewed w/ pt, verbalized understanding.

## 2018-09-09 NOTE — CARE PLAN
Problem: Altered physiologic condition related to postoperative  delivery  Goal: Patient physiologically stable as evidenced by normal lochia, palpable uterine involution and vital signs within normal limits    Intervention: Massage fundus as necessary to prevent excessive lochia  Fundal massage done, patient tolerated, scant lochia noted.      Problem: Potential for postpartum infection related to surgical incision, compromised uterine condition, urinary tract or respiratory compromise  Goal: Patient will be afebrile and free from signs and symptoms of infection  Outcome: PROGRESSING AS EXPECTED  Patient incision does not show s/s of infection.

## 2018-09-09 NOTE — DISCHARGE SUMMARY
Discharge Summary:      Sveta Coronel      Admit Date:   2018  Discharge Date:  2018     Admitting diagnosis:  pregnancy  Labor and delivery indication for care or intervention  Labor and delivery indication for care or intervention  Failure to descend   Discharge Diagnosis: Status post  for failure to progress.  Pregnancy Complications: none  Tubal Ligation:  no        History:  History reviewed. No pertinent past medical history.  OB History    Para Term  AB Living   2       1     SAB TAB Ectopic Molar Multiple Live Births                    # Outcome Date GA Lbr Louis/2nd Weight Sex Delivery Anes PTL Lv   2 Current            1 AB 17     TAB              Sulfa drugs  There are no active problems to display for this patient.       Hospital Course:   28 y.o. , now para 1, was admitted with the above mentioned diagnosis, underwent Active Labor,  for failure to progress. Patient postpartum course was unremarkable, with progressive advancement in diet , ambulation and toleration of oral analgesia. Patient without complaints today and desires discharge.      Vitals:    18 1200 18 1600 18 2000 18 0800   BP: 116/77 123/61 123/80 129/92   Pulse: 81 83 86 96   Resp:    Temp: 36.4 °C (97.5 °F) 36.5 °C (97.7 °F) 36.7 °C (98.1 °F) 36.6 °C (97.9 °F)   TempSrc:       SpO2: 95% 97% 95% 95%   Weight:       Height:           Current Facility-Administered Medications   Medication Dose   • acetaminophen (TYLENOL) tablet 325 mg  325 mg   • oxyCODONE-acetaminophen (PERCOCET) 5-325 MG per tablet 1 Tab  1 Tab   • oxyCODONE-acetaminophen (PERCOCET) 5-325 MG per tablet 2 Tab  2 Tab   • ondansetron (ZOFRAN) syringe/vial injection 4 mg  4 mg    Or   • ondansetron (ZOFRAN ODT) dispertab 4 mg  4 mg   • D5LR infusion     • LR infusion     • miSOPROStol (CYTOTEC) tablet 600 mcg  600 mcg   • PRN oxytocin (PITOCIN) (20 Units/1000 mL) PRN for excessive  uterine bleeding - See Admin Instr  125-999 mL/hr   • simethicone (MYLICON) chewable tab 80 mg  80 mg   • prenatal plus vitamin (STUARTNATAL 1+1) 27-1 MG tablet 1 Tab  1 Tab       Exam:  Breast Exam: negative  Abdomen: Abdomen soft, non-tender. BS normal. No masses,  No organomegaly  Fundus Non Tender: yes  Incision: healing well with good reapproximation, no evidence of infection, separation or keloid formation.  Perineum: perineum intact  Extremity: extremities, peripheral pulses and reflexes normal     Labs:  Recent Labs      09/06/18   1155  09/08/18   0506   WBC  5.7  14.4*   RBC  4.45  3.71*   HEMOGLOBIN  14.0  11.6*   HEMATOCRIT  40.4  35.1*   MCV  90.8  94.6   MCH  31.5  31.3   MCHC  34.7  33.0*   RDW  44.8  46.3   PLATELETCT  155*  127*   MPV  11.5  11.1        Activity:   Discharge to home  Pelvic Rest x 6 weeks    Assessment:  normal postpartum course  Discharge Assessment: No areas of skin breakdown/redness; surgical incision intact/healing     [facility] up: [facility]  1 week for incision check.      Discharge Meds:   Current Outpatient Prescriptions   Medication Sig Dispense Refill   • oxyCODONE-acetaminophen (PERCOCET) 5-325 MG Tab Take 1 Tab by mouth every four hours as needed for up to 20 doses. 15 Tab 0   • ondansetron (ZOFRAN ODT) 4 MG TABLET DISPERSIBLE Take 1 Tab by mouth every 6 hours as needed (give PO if IV route is unavailable). 20 Tab 1   • ibuprofen (MOTRIN) 600 MG Tab Take 1 Tab by mouth every 6 hours as needed. 30 Tab 1       Braeden Blas M.D.

## 2018-09-09 NOTE — PROGRESS NOTES
Assumed care @ 5920. Bedside report from REBECCA Kahn. Awake, resting in pt. Reports fatigue, barely slept d/t cluster feedings. Request for stool softner. Denies n/v. Reports voiding w/out problems. Lochia light, fundus firm. Bonding well w/ baby.

## 2018-09-09 NOTE — DISCHARGE INSTRUCTIONS
POSTPARTUM DISCHARGE INSTRUCTIONS FOR MOM    PELVIC REST FOR 6 WEEKS    1. Discharge home with pelvic rest for 6 weeks.     2. Follow up appointment x 1 weeks at [facility] .         YOB: 1990   Age: 28 y.o.               Admit Date: 2018     Discharge Date: 2018  Attending Doctor:  Braeden Blas M.D.                  Allergies:  Sulfa drugs    Discharged to home by car. Discharged via wheelchair, hospital escort: Yes.  Special equipment needed: Not Applicable  Belongings with: Personal  Be sure to schedule a follow-up appointment with your primary care doctor or any specialists as instructed.     Discharge Plan:   Influenza Vaccine Indication: Indicated: Not available from distributor/    REASONS TO CALL YOUR OBSTETRICIAN:  1.   Persistent fever or shaking chills (Temperature higher than 100.4)  2.   Heavy bleeding (soaking more than 1 pad per hour); Passing clots  3.   Foul odor from vagina  4.   Mastitis (Breast infection; breast pain, chills, fever, redness)  5.   Urinary pain, burning or frequency  6.   Episiotomy infection  7.   Abdominal incision infection  8.   Severe depression longer than 24 hours    HAND WASHING  · Prior to handling the baby.  · Before breastfeeding or bottle feeding baby.  · After using the bathroom or changing the baby's diaper.    WOUND CARE  Ask your physician for additional care instructions.  In general:    ·  Incision:      · Keep clean and dry.    · Do NOT lift anything heavier than your baby for up to 6 weeks.    · There should not be any opening or pus.      VAGINAL CARE  · Nothing inside vagina for 6 weeks: no sexual intercourse, tampons or douching.  · Bleeding may continue for 2-4 weeks.  Amount may vary.    · Call your physician for heavy bleeding which means soaking more than 1 pad per hour    BIRTH CONTROL  · It is possible to become pregnant at any time after delivery and while breastfeeding.  · Plan to  "discuss a method of birth control with your physician at your follow up visit. visit.    DIET AND ELIMINATION  · Eating more fiber (bran cereal, fruits, and vegetables) and drinking plenty of fluids will help to avoid constipation.  · Urinary frequency after childbirth is normal.    POSTPARTUM BLUES  During the first few days after birth, you may experience a sense of the \"blues\" which may include impatience, irritability or even crying.  These feeling come and go quickly.  However, as many as 1 in 10 women experience emotional symptoms known as postpartum depression.    Postpartum depression:  May start as early as the second or third day after delivery or take several weeks or months to develop.  Symptoms of \"blues\" are present, but are more intense:  Crying spells; loss of appetite; feelings of hopelessness or loss of control; fear of touching the baby; over concern or no concern at all about the baby; little or no concern about your own appearance/caring for yourself; and/or inability to sleep or excessive sleeping.  Contact your physician if you are experiencing any of these symptoms.    Crisis Hotline:  · [place] Crisis Hotline:  8-973-KNSFOUZ  Or 1-291.456.7076  · [place] Crisis Hotline:  1-727.534.3390  Or 732-188-6081    PREVENTING SHAKEN BABY:  If you are angry or stressed, PUT THE BABY IN THE CRIB, step away, take some deep breaths, and wait until you are calm to care for the baby.  DO NOT SHAKE THE BABY.  You are not alone, call a supporter for help.    · Crisis Call Center 24/7 crisis line 368-960-7688 or 1-399.587.7237  · You can also text them, text \"ANSWER\" to 619479    QUIT SMOKING/TOBACCO USE:  I understand the use of any tobacco products increases my chance of suffering from future heart disease and could cause other illnesses which may shorten my life. Quitting the use of tobacco products is the single most important thing I can do to improve my health. For further information on smoking / " tobacco cessation call a Toll Free Quit Line at 1-212.636.1659 (*[employer] [place]) or 1-621.622.1412 (American Lung Association) or you can access the web based program at www.lungusa.org.    · [place] Tobacco Users Help Line:  (500) 122-9373       Toll Free: 1-676.503.5243  · Quit Tobacco Program [facility] (630)011-3062    DEPRESSION / SUICIDE RISK:  As you are discharged from this [facility], it is important to learn how to keep safe from harming yourself.    Recognize the warning signs:  · Abrupt changes in personality, positive or negative- including increase in energy   · Giving away possessions  · Change in eating patterns- significant weight changes-  positive or negative  · Change in sleeping patterns- unable to sleep or sleeping all the time   · Unwillingness or inability to communicate  · Depression  · Unusual sadness, discouragement and loneliness  · Talk of wanting to die  · Neglect of personal appearance   · Rebelliousness- reckless behavior  · Withdrawal from people/activities they love  · Confusion- inability to concentrate     If you or a loved one observes any of these behaviors or has concerns about self-harm, here's what you can do:  · Talk about it- your feelings and reasons for harming yourself  · Remove any means that you might use to hurt yourself (examples: pills, rope, extension cords, firearm)  · Get professional help from the community (Mental Health, Substance Abuse, psychological counseling)  · Do not be alone:Call your Safe Contact- someone whom you trust who will be there for you.  · Call your local CRISIS HOTLINE 808-3420 or 870-866-5061  · Call your local Children's Mobile Crisis Response Team [place] (270) 257-2188 or www.[place]  · Call the toll free National Suicide Prevention Hotlines   · National Suicide Prevention Lifeline 986-352-QOOL (1637)  · National Hope Line Network 800-SUICIDE (147-3533)    DISCHARGE  SURVEY:  Thank you for choosing [facility].  We hope we provided you with very good care.  You may be receiving a survey in the mail.  Please fill it out.  Your opinion is valuable to us.    ADDITIONAL EDUCATIONAL MATERIALS GIVEN TO PATIENT:        My signature on this form indicates that:  1.  I have reviewed and understand the above information  2.  My questions regarding this information have been answered to my satisfaction.  3.  I have formulated a plan with my discharge nurse to obtain my prescribed medication for home.

## 2019-06-07 ENCOUNTER — APPOINTMENT (OUTPATIENT)
Dept: DERMATOLOGY | Facility: IMAGING CENTER | Age: 29
End: 2019-06-07

## 2020-09-01 ENCOUNTER — HOSPITAL ENCOUNTER (OUTPATIENT)
Facility: MEDICAL CENTER | Age: 30
End: 2020-09-01
Attending: FAMILY MEDICINE
Payer: COMMERCIAL

## 2020-09-01 ENCOUNTER — OFFICE VISIT (OUTPATIENT)
Dept: URGENT CARE | Facility: PHYSICIAN GROUP | Age: 30
End: 2020-09-01
Payer: COMMERCIAL

## 2020-09-01 VITALS
SYSTOLIC BLOOD PRESSURE: 120 MMHG | HEIGHT: 63 IN | DIASTOLIC BLOOD PRESSURE: 76 MMHG | WEIGHT: 120 LBS | RESPIRATION RATE: 16 BRPM | OXYGEN SATURATION: 98 % | BODY MASS INDEX: 21.26 KG/M2 | HEART RATE: 66 BPM | TEMPERATURE: 97.5 F

## 2020-09-01 DIAGNOSIS — Z20.822 SUSPECTED COVID-19 VIRUS INFECTION: ICD-10-CM

## 2020-09-01 LAB
COVID ORDER STATUS COVID19: NORMAL
INT CON NEG: NEGATIVE
INT CON POS: POSITIVE
S PYO AG THROAT QL: NORMAL

## 2020-09-01 PROCEDURE — U0003 INFECTIOUS AGENT DETECTION BY NUCLEIC ACID (DNA OR RNA); SEVERE ACUTE RESPIRATORY SYNDROME CORONAVIRUS 2 (SARS-COV-2) (CORONAVIRUS DISEASE [COVID-19]), AMPLIFIED PROBE TECHNIQUE, MAKING USE OF HIGH THROUGHPUT TECHNOLOGIES AS DESCRIBED BY CMS-2020-01-R: HCPCS

## 2020-09-01 PROCEDURE — 87880 STREP A ASSAY W/OPTIC: CPT | Performed by: FAMILY MEDICINE

## 2020-09-01 PROCEDURE — 99203 OFFICE O/P NEW LOW 30 MIN: CPT | Performed by: FAMILY MEDICINE

## 2020-09-01 ASSESSMENT — ENCOUNTER SYMPTOMS
DIARRHEA: 0
VOMITING: 0
MYALGIAS: 1
SHORTNESS OF BREATH: 0
COUGH: 1
FEVER: 1
SORE THROAT: 1
ABDOMINAL PAIN: 0
HEADACHES: 1

## 2020-09-01 NOTE — PROGRESS NOTES
"Subjective:     Sveta Coronel is a 30 y.o. female who presents for Cough (cough, fever, sore throat, headache x4 days)    HPI  Pt presents for evaluation of a new problem   Started as a sore throat 4 days ago   Progressed to moderate cough, headache, and fevers   Cough is mildly productive   Having mild myalgias   Highest temp 100.5     Review of Systems   Constitutional: Positive for fever and malaise/fatigue.   HENT: Positive for congestion and sore throat.    Respiratory: Positive for cough. Negative for shortness of breath.    Cardiovascular: Negative for chest pain.   Gastrointestinal: Negative for abdominal pain, diarrhea and vomiting.   Musculoskeletal: Positive for myalgias.   Skin: Negative for rash.   Neurological: Positive for headaches.       PMH: No chronic medical problems  MEDS: No daily meds  ALLERGIES:   Allergies   Allergen Reactions   • Sulfa Drugs      SURGHX:   Past Surgical History:   Procedure Laterality Date   • PRIMARY C SECTION  9/7/2018    Procedure: PRIMARY C SECTION;  Surgeon: Braeden Blas M.D.;  Location: LABOR AND DELIVERY;  Service: Labor and Delivery   • OTHER      breast augmentation     SOCHX:  reports that she has never smoked. She has never used smokeless tobacco. She reports that she does not drink alcohol or use drugs.  FH: Family history was reviewed, not contributing to acute illness     Objective:   /76 (BP Location: Left arm, Patient Position: Sitting, BP Cuff Size: Small adult)   Pulse 66   Temp 36.4 °C (97.5 °F) (Temporal)   Resp 16   Ht 1.6 m (5' 3\")   Wt 54.4 kg (120 lb)   SpO2 98%   BMI 21.26 kg/m²     Physical Exam  Constitutional:       General: She is not in acute distress.     Appearance: She is well-developed. She is not diaphoretic.   HENT:      Head: Normocephalic and atraumatic.      Right Ear: Tympanic membrane, ear canal and external ear normal.      Left Ear: Tympanic membrane, ear canal and external ear normal.      Nose: Congestion " and rhinorrhea present.      Mouth/Throat:      Mouth: Mucous membranes are moist.      Pharynx: Oropharynx is clear. No oropharyngeal exudate or posterior oropharyngeal erythema.   Eyes:      General: No scleral icterus.        Right eye: No discharge.         Left eye: No discharge.      Conjunctiva/sclera: Conjunctivae normal.   Neck:      Musculoskeletal: Normal range of motion.      Trachea: No tracheal deviation.   Cardiovascular:      Rate and Rhythm: Normal rate and regular rhythm.   Pulmonary:      Effort: Pulmonary effort is normal. No respiratory distress.      Breath sounds: Normal breath sounds. No wheezing or rales.   Skin:     General: Skin is warm and dry.      Findings: No rash.   Neurological:      Mental Status: She is alert and oriented to person, place, and time.   Psychiatric:         Mood and Affect: Mood normal.         Behavior: Behavior normal.         Thought Content: Thought content normal.         Judgment: Judgment normal.         Assessment/Plan:   Assessment    1. Suspected COVID-19 virus infection  - COVID/SARS COV-2 PCR; Future  - POCT Rapid Strep A    Rapid strep negative.  Will obtain COVID-19 swab and patient will self isolate until results available.  Follow-up test results.

## 2020-09-01 NOTE — LETTER
September 1, 2020      To Whom It May Concern:           This is confirmation that Sveta Coronel attended her scheduled appointment with Jung Schultz M.D. on 9/01/20.  She is being tested for COVID-19.  She may not return to work until test results are available.  If positive, may not return until 10 days after symptoms started (9/7/20).           If you have any questions please do not hesitate to call me at the phone number listed below.      Sincerely,          Jung Schultz M.D.  [phone]

## 2020-09-02 LAB
SARS-COV-2 RNA RESP QL NAA+PROBE: NOTDETECTED
SPECIMEN SOURCE: NORMAL

## 2021-12-06 ENCOUNTER — OFFICE VISIT (OUTPATIENT)
Dept: MEDICAL GROUP | Facility: CLINIC | Age: 31
End: 2021-12-06
Payer: COMMERCIAL

## 2021-12-06 VITALS
BODY MASS INDEX: 21.97 KG/M2 | SYSTOLIC BLOOD PRESSURE: 116 MMHG | WEIGHT: 124 LBS | DIASTOLIC BLOOD PRESSURE: 74 MMHG | OXYGEN SATURATION: 96 % | RESPIRATION RATE: 16 BRPM | HEART RATE: 90 BPM | TEMPERATURE: 98 F

## 2021-12-06 DIAGNOSIS — N30.00 ACUTE CYSTITIS WITHOUT HEMATURIA: ICD-10-CM

## 2021-12-06 PROCEDURE — 99213 OFFICE O/P EST LOW 20 MIN: CPT | Mod: GE | Performed by: STUDENT IN AN ORGANIZED HEALTH CARE EDUCATION/TRAINING PROGRAM

## 2021-12-06 RX ORDER — NITROFURANTOIN 25; 75 MG/1; MG/1
CAPSULE ORAL
COMMUNITY
Start: 2020-03-10 | End: 2021-12-06

## 2021-12-06 RX ORDER — FLUCONAZOLE 150 MG/1
TABLET ORAL
COMMUNITY
Start: 2020-03-10 | End: 2021-12-06

## 2021-12-06 RX ORDER — NITROFURANTOIN 25; 75 MG/1; MG/1
100 CAPSULE ORAL 2 TIMES DAILY
Qty: 10 CAPSULE | Refills: 0 | Status: SHIPPED | OUTPATIENT
Start: 2021-12-06 | End: 2021-12-11

## 2021-12-06 RX ORDER — METRONIDAZOLE 500 MG/1
TABLET ORAL
COMMUNITY
Start: 2020-03-10 | End: 2021-12-06

## 2021-12-06 RX ORDER — DEXTROAMPHETAMINE SACCHARATE, AMPHETAMINE ASPARTATE MONOHYDRATE, DEXTROAMPHETAMINE SULFATE AND AMPHETAMINE SULFATE 3.75; 3.75; 3.75; 3.75 MG/1; MG/1; MG/1; MG/1
CAPSULE, EXTENDED RELEASE ORAL 2 TIMES DAILY
COMMUNITY
Start: 2019-03-22 | End: 2023-03-07

## 2021-12-06 RX ORDER — AMOXICILLIN 500 MG/1
CAPSULE ORAL
COMMUNITY
Start: 2020-07-01 | End: 2021-12-06

## 2021-12-06 NOTE — PROGRESS NOTES
Chief Complaint   Patient presents with   • Dysuria       HPI:  Symptom onset: 2 days ago   Current symptoms: urgent, frequent voids. No blood noted in urine.  Since onset symptoms are: Unchanged  Treatments tried: OTC antispasm med.  Associated symptoms: Negative for fever, flank pain, nausea and vomiting, vaginal discharge, pelvic pain.  History is negative for frequent UTI.     ROS:  Denies fever, chills, vomiting or abdominal pain.     OBJECTIVE:  /74   Pulse 90   Temp 36.7 °C (98 °F)   Resp 16   Wt 56.2 kg (124 lb)   SpO2 96%   Gen: Alert, NAD.  Chest: Lungs clear to auscultation, CV RRR.  Abdomen: Soft, tender in suprapubic region. No CVAT. Normal bowel sounds.     No results found for: POCCOLOR, POCAPPEAR, POCLEUKEST, POCNITRITE, POCUROBILIGE, POCPROTEIN, POCURPH, POCBLOOD, POCSPGRV, POCKETONES, POCBILIRUBIN, POCGLUCUA       ASSESSMENT/PLAN:     1. Acute cystitis without hematuria       1. No POC UA available in office today. Lab UA and culture ordered. Urine sent for culture. Start macrobid BID for 5 days.   2. Provided education to drink plenty of fluids, wipe front to back every void and bowel movement.   3. Return to clinic if symptoms not improving within 3-4 days or in case of vomiting, fever, increasing pain.

## 2021-12-09 ENCOUNTER — TELEPHONE (OUTPATIENT)
Dept: MEDICAL GROUP | Facility: CLINIC | Age: 31
End: 2021-12-09

## 2021-12-09 RX ORDER — CIPROFLOXACIN 250 MG/1
250 TABLET, FILM COATED ORAL 2 TIMES DAILY
Qty: 6 TABLET | Refills: 0 | Status: SHIPPED | OUTPATIENT
Start: 2021-12-09 | End: 2021-12-12

## 2021-12-09 NOTE — TELEPHONE ENCOUNTER
VOICEMAIL  1. Caller Name: Sveta                      Call Back Number: [phone]    2. Message: Pt called yesterday and stated that the pharmacy notified her that medication that was sent for her they do no carry right now or is on back order, if you could please send in something new for her. She is allergic to sulfa.     3. Patient approves office to leave a detailed voicemail/MyChart message: yes

## 2022-02-08 ENCOUNTER — OFFICE VISIT (OUTPATIENT)
Dept: MEDICAL GROUP | Facility: CLINIC | Age: 32
End: 2022-02-08
Payer: COMMERCIAL

## 2022-02-08 VITALS
HEIGHT: 62 IN | WEIGHT: 125.8 LBS | TEMPERATURE: 97 F | DIASTOLIC BLOOD PRESSURE: 76 MMHG | RESPIRATION RATE: 16 BRPM | HEART RATE: 90 BPM | SYSTOLIC BLOOD PRESSURE: 111 MMHG | BODY MASS INDEX: 23.15 KG/M2 | OXYGEN SATURATION: 96 %

## 2022-02-08 DIAGNOSIS — Z01.419 WELL WOMAN EXAM: ICD-10-CM

## 2022-02-08 DIAGNOSIS — R25.2 LEG CRAMPING: ICD-10-CM

## 2022-02-08 PROBLEM — Q10.3 EYELID ANOMALY: Status: ACTIVE | Noted: 2020-11-24

## 2022-02-08 PROBLEM — H02.9 EYELID ANOMALY: Status: ACTIVE | Noted: 2020-11-24

## 2022-02-08 PROBLEM — Z98.82 HISTORY OF BREAST AUGMENTATION: Status: ACTIVE | Noted: 2022-02-08

## 2022-02-08 PROBLEM — F90.9 ADHD: Status: ACTIVE | Noted: 2022-02-08

## 2022-02-08 PROCEDURE — 99395 PREV VISIT EST AGE 18-39: CPT | Mod: GC | Performed by: STUDENT IN AN ORGANIZED HEALTH CARE EDUCATION/TRAINING PROGRAM

## 2022-02-08 ASSESSMENT — PATIENT HEALTH QUESTIONNAIRE - PHQ9: CLINICAL INTERPRETATION OF PHQ2 SCORE: 0

## 2022-02-09 NOTE — PROGRESS NOTES
"Subjective:     CC: annual exam    HPI:   Sveta presents today with :    Problem   Adhd   History of Breast Augmentation   Well Woman Exam    Patient is here for annual exam.  She has no acute concerns or complaints today.  Overall very fit and healthy.  She works as a dental assistant here in town.  She takes Adderall twice daily which is prescribed via virtual psychiatrist.  She is sexually active.  In the last year she has had 2-3 male partners.  She is not using any birth control at this time.  Does not wish to start any.  No concerns for STDs.  Family history remarkable for breast cancer in maternal aunt.     Leg Cramping   Eyelid Anomaly       Current Outpatient Medications Ordered in Epic   Medication Sig Dispense Refill   • amphetamine-dextroamphetamine (ADDERALL XR, 15MG,) 15 MG XR capsule 2 times a day.       No current [facility]-ordered facility-administered medications on file.         ROS:  Gen: no fevers/chills, no changes in weight  Eyes: no changes in vision  ENT: no changes in hearing  Pulm: no sob, no cough  CV: no chest pain, no palpitations  GI: no nausea/vomiting, no diarrhea  MSk: no myalgias  Skin: no rash  Neuro: no headaches, no numbness/tingling      Objective:     Exam:  /76   Pulse 90   Temp 36.1 °C (97 °F)   Resp 16   Ht 1.575 m (5' 2\")   Wt 57.1 kg (125 lb 12.8 oz)   SpO2 96%   BMI 23.01 kg/m²  Body mass index is 23.01 kg/m².    Gen: Alert and oriented, No apparent distress.  Neck: Neck is supple without lymphadenopathy.  Lungs: Normal effort, CTA bilaterally, no wheezes, rhonchi, or rales  CV: Regular rate and rhythm. No murmurs, rubs, or gallops.  Ext: No clubbing, cyanosis, edema.    Labs: ordered     Assessment & Plan:     31 y.o. female with the following -     Problem List Items Addressed This Visit     Well woman exam     Annual physical completed.  Patient is going to return to clinic for a Pap smear as she is currently on her period.  We will plan to do STD testing and " Pap smear at that time.  Return to clinic for Pap smear.         Relevant Orders    CBC WITH DIFFERENTIAL    Leg cramping    Relevant Orders    CBC WITH DIFFERENTIAL    Comp Metabolic Panel    MAGNESIUM            Return if symptoms worsen or fail to improve.    Lyubov Mitchell MD   PGY2    Please note that this dictation was created using voice recognition software. I have made every reasonable attempt to correct obvious errors, but I expect that there are errors of grammar and possibly content that I did not discover before finalizing the note.

## 2022-02-09 NOTE — ASSESSMENT & PLAN NOTE
Annual physical completed.  Patient is going to return to clinic for a Pap smear as she is currently on her period.  We will plan to do STD testing and Pap smear at that time.  Return to clinic for Pap smear.

## 2022-03-21 ENCOUNTER — OFFICE VISIT (OUTPATIENT)
Dept: MEDICAL GROUP | Facility: CLINIC | Age: 32
End: 2022-03-21
Payer: COMMERCIAL

## 2022-03-21 ENCOUNTER — HOSPITAL ENCOUNTER (OUTPATIENT)
Facility: MEDICAL CENTER | Age: 32
End: 2022-03-21
Attending: STUDENT IN AN ORGANIZED HEALTH CARE EDUCATION/TRAINING PROGRAM
Payer: COMMERCIAL

## 2022-03-21 VITALS
WEIGHT: 123 LBS | HEIGHT: 62 IN | DIASTOLIC BLOOD PRESSURE: 73 MMHG | BODY MASS INDEX: 22.63 KG/M2 | TEMPERATURE: 97 F | SYSTOLIC BLOOD PRESSURE: 106 MMHG | RESPIRATION RATE: 16 BRPM | HEART RATE: 84 BPM | OXYGEN SATURATION: 95 %

## 2022-03-21 DIAGNOSIS — Z01.419 WELL WOMAN EXAM: ICD-10-CM

## 2022-03-21 PROCEDURE — 99395 PREV VISIT EST AGE 18-39: CPT | Mod: GE | Performed by: STUDENT IN AN ORGANIZED HEALTH CARE EDUCATION/TRAINING PROGRAM

## 2022-03-21 PROCEDURE — 88175 CYTOPATH C/V AUTO FLUID REDO: CPT

## 2022-03-21 RX ORDER — DEXTROAMPHETAMINE SACCHARATE, AMPHETAMINE ASPARTATE, DEXTROAMPHETAMINE SULFATE AND AMPHETAMINE SULFATE 3.75; 3.75; 3.75; 3.75 MG/1; MG/1; MG/1; MG/1
1 TABLET ORAL 2 TIMES DAILY
COMMUNITY
Start: 2022-03-04

## 2022-03-21 NOTE — ASSESSMENT & PLAN NOTE
No changes since previous well woman exam.  Patient is here today for Pap smear.  No concerns at this time.  We will send out for cytology and STD testing.  We will plan to MyChart patient with results.

## 2022-03-21 NOTE — PROGRESS NOTES
"Subjective:     CC: pap smear     HPI:   Sveta presents today with :      Problem   Well Woman Exam    Patient is here for annual exam.  She has no acute concerns or complaints today.  Overall very fit and healthy.  She works as a dental assistant here in town.  She takes Adderall twice daily which is prescribed via virtual psychiatrist.  She is sexually active.  In the last year she has had 2-3 male partners.  She is not using any birth control at this time.  Does not wish to start any.  No concerns for STDs.  Family history remarkable for breast cancer in maternal aunt.         Current Outpatient Medications Ordered in Epic   Medication Sig Dispense Refill   • amphetamine-dextroamphetamine (ADDERALL) 15 MG tablet Take 1 Tablet by mouth 2 times a day.     • amphetamine-dextroamphetamine (ADDERALL XR, 15MG,) 15 MG XR capsule 2 times a day.       No current [facility]-ordered facility-administered medications on file.         ROS:  Gen: no fevers/chills, no changes in weight  Pulm: no sob, no cough  CV: no chest pain, no palpitations  GI: no nausea/vomiting, no diarrhea      Objective:     Exam:  /73   Pulse 84   Temp 36.1 °C (97 °F)   Resp 16   Ht 1.575 m (5' 2\")   Wt 55.8 kg (123 lb)   SpO2 95%   BMI 22.50 kg/m²  Body mass index is 22.5 kg/m².    Gen: Alert and oriented, No apparent distress.  Neck: Neck is supple without lymphadenopathy.  GYN exam:  External genitalia: Normal no lesions  Vagina: Pink, moist, well rugated, cervix visualized, no lesions, scant vaginal discharge, non-foul-smelling.  Ext: No clubbing, cyanosis, edema.    Labs: pap completed today     Assessment & Plan:     32 y.o. female with the following -     Problem List Items Addressed This Visit     Well woman exam     No changes since previous well woman exam.  Patient is here today for Pap smear.  No concerns at this time.  We will send out for cytology and STD testing.  We will plan to MyChart patient with results.         "         Return if symptoms worsen or fail to improve.    Lyubov Mitchell MD   PGY2    Please note that this dictation was created using voice recognition software. I have made every reasonable attempt to correct obvious errors, but I expect that there are errors of grammar and possibly content that I did not discover before finalizing the note.

## 2022-03-22 LAB — CYTOLOGY REG CYTOL: NORMAL

## 2022-03-22 PROCEDURE — 88175 CYTOPATH C/V AUTO FLUID REDO: CPT

## 2022-03-23 ENCOUNTER — TELEPHONE (OUTPATIENT)
Dept: MEDICAL GROUP | Facility: CLINIC | Age: 32
End: 2022-03-23
Payer: COMMERCIAL

## 2022-03-23 NOTE — TELEPHONE ENCOUNTER
VOICEMAIL  1. Caller Name: Sveta                      Call Back Number: [phone]    2. Message: Pt called and is asking about her pap results, she stated she received the results on her mychart but is not sure what they mean. Please give her a call or message her through [employer].     3. Patient approves office to leave a detailed voicemail/MyChart message: yes

## 2022-05-11 ENCOUNTER — OFFICE VISIT (OUTPATIENT)
Dept: URGENT CARE | Facility: PHYSICIAN GROUP | Age: 32
End: 2022-05-11
Payer: COMMERCIAL

## 2022-05-11 VITALS
TEMPERATURE: 97.4 F | OXYGEN SATURATION: 100 % | DIASTOLIC BLOOD PRESSURE: 74 MMHG | WEIGHT: 120 LBS | RESPIRATION RATE: 16 BRPM | BODY MASS INDEX: 22.08 KG/M2 | HEIGHT: 62 IN | SYSTOLIC BLOOD PRESSURE: 112 MMHG | HEART RATE: 94 BPM

## 2022-05-11 DIAGNOSIS — J06.9 URI WITH COUGH AND CONGESTION: ICD-10-CM

## 2022-05-11 DIAGNOSIS — J02.9 SORE THROAT: ICD-10-CM

## 2022-05-11 LAB
EXTERNAL QUALITY CONTROL: NORMAL
FLUAV+FLUBV AG SPEC QL IA: NEGATIVE
INT CON NEG: NEGATIVE
INT CON NEG: NEGATIVE
INT CON POS: POSITIVE
INT CON POS: POSITIVE
S PYO AG THROAT QL: NEGATIVE
SARS-COV+SARS-COV-2 AG RESP QL IA.RAPID: NEGATIVE

## 2022-05-11 PROCEDURE — 87804 INFLUENZA ASSAY W/OPTIC: CPT | Performed by: PHYSICIAN ASSISTANT

## 2022-05-11 PROCEDURE — 87880 STREP A ASSAY W/OPTIC: CPT | Performed by: PHYSICIAN ASSISTANT

## 2022-05-11 PROCEDURE — 99213 OFFICE O/P EST LOW 20 MIN: CPT | Performed by: PHYSICIAN ASSISTANT

## 2022-05-11 PROCEDURE — 87426 SARSCOV CORONAVIRUS AG IA: CPT | Performed by: PHYSICIAN ASSISTANT

## 2022-05-11 ASSESSMENT — ENCOUNTER SYMPTOMS
EYE REDNESS: 0
SORE THROAT: 1
VOMITING: 0
EYE DISCHARGE: 0
DIARRHEA: 0
SHORTNESS OF BREATH: 0
COUGH: 1
TROUBLE SWALLOWING: 0
HEADACHES: 1
MYALGIAS: 1
NAUSEA: 0
FEVER: 1

## 2022-05-11 NOTE — PROGRESS NOTES
Subjective     Sveta Jj is a 32 y.o. female who presents with Sore Throat (X 3 days with fever, headache and (R) ear pain)            Pharyngitis   This is a new problem. Episode onset: x 3 weeks ago. The problem has been unchanged. Neither side of throat is experiencing more pain than the other. The maximum temperature recorded prior to her arrival was 100.4 - 100.9 F (The patient reports an intermittent fever.  The patient states she had a fever this morning with a max temp of 100.9.). Associated symptoms include congestion, coughing, ear pain and headaches. Pertinent negatives include no diarrhea, drooling, shortness of breath, trouble swallowing or vomiting. She has had no exposure to strep. She has tried acetaminophen for the symptoms.     The patient reports no recent sick contacts.  No known exposure to COVID-19.  No known exposure to influenza.    PMH:  has no past medical history on file.  MEDS:   Current Outpatient Medications:   •  amphetamine-dextroamphetamine (ADDERALL XR) 15 MG XR capsule, 2 times a day., Disp: , Rfl:   •  amphetamine-dextroamphetamine (ADDERALL) 15 MG tablet, Take 1 Tablet by mouth 2 times a day., Disp: , Rfl:   ALLERGIES:   Allergies   Allergen Reactions   • Sulfa Drugs      SURGHX:   Past Surgical History:   Procedure Laterality Date   • PRIMARY C SECTION  9/7/2018    Procedure: PRIMARY C SECTION;  Surgeon: Braeden Blas M.D.;  Location: LABOR AND DELIVERY;  Service: Labor and Delivery   • OTHER      breast augmentation     SOCHX:  reports that she has never smoked. She has never used smokeless tobacco. She reports that she does not drink alcohol and does not use drugs.  FH: Family history was reviewed, no pertinent findings to report    Review of Systems   Constitutional: Positive for fever.   HENT: Positive for congestion, ear pain and sore throat. Negative for drooling and trouble swallowing.    Eyes: Negative for discharge and redness.   Respiratory: Positive  "for cough. Negative for shortness of breath.    Cardiovascular: Negative for chest pain.   Gastrointestinal: Negative for diarrhea, nausea and vomiting.   Musculoskeletal: Positive for myalgias.   Neurological: Positive for headaches.              Objective     /74 (BP Location: Left arm, Patient Position: Sitting, BP Cuff Size: Adult)   Pulse 94   Temp 36.3 °C (97.4 °F) (Temporal)   Resp 16   Ht 1.575 m (5' 2\")   Wt 54.4 kg (120 lb)   SpO2 100%   BMI 21.95 kg/m²      Physical Exam  Constitutional:       General: She is not in acute distress.     Appearance: Normal appearance. She is not ill-appearing.   HENT:      Head: Normocephalic and atraumatic.      Right Ear: Tympanic membrane, ear canal and external ear normal.      Left Ear: Tympanic membrane, ear canal and external ear normal.      Nose: Nose normal.      Mouth/Throat:      Mouth: Mucous membranes are moist.      Pharynx: Oropharynx is clear. No posterior oropharyngeal erythema.   Eyes:      Extraocular Movements: Extraocular movements intact.      Conjunctiva/sclera: Conjunctivae normal.   Cardiovascular:      Rate and Rhythm: Normal rate and regular rhythm.      Heart sounds: Normal heart sounds.   Pulmonary:      Effort: Pulmonary effort is normal. No respiratory distress.      Breath sounds: Normal breath sounds. No wheezing.   Musculoskeletal:         General: Normal range of motion.      Cervical back: Normal range of motion and neck supple.   Skin:     General: Skin is warm and dry.   Neurological:      Mental Status: She is alert and oriented to person, place, and time.               Progress:  POCT Rapid Strep: NEGATIVE     POCT Influenza: NEGATIVE     POCT Rapid COVID-19: NEGATIVE                 Assessment & Plan          1. URI with cough and congestion  - POCT Influenza A/B  - POCT SARS-COV Antigen ADRIANA (Symptomatic only)    2. Sore throat  - POCT Rapid Strep A      The patient's presenting symptoms and physical exam endings are " consistent with URI with associated cough and congestion.  The patient is also experiencing a sore throat.  The patient's physical exam today in clinic was normal.  The patient appears in no acute distress.  The patient's vital signs are stable and within normal limits.  She is afebrile today in clinic.  The patient's POCT rapid strep test today in clinic was negative.  Discussed likely viral etiology with the patient.  The patient's POCT influenza testing and POCT COVID-19 testing were both negative.  Advised the patient to monitor for worsening signs and or symptoms.  Recommend OTC medications and supportive care for symptomatic management.  Recommend patient follow-up with PCP as needed.  Discussed return precautions with the patient, and she verbalized understanding.    Differential diagnoses, supportive care, and indications for immediate follow-up discussed with patient.   Instructed to return to clinic or nearest emergency department for any change in condition, further concerns, or worsening of symptoms.    OTC Tylenol or Motrin for fever/discomfort.  OTC cough/cold medication for symptomatic relief  OTC oral decongestants for symptomatic relief  OTC antihistamines for symptomatic relief  OTC Flonase for symptomatic relief  OTC Supportive Care for Congestion - saline nasal spray or neti pot  OTC Supportive Care for Sore Throat - warm salt water gargles, sore throat lozenges, warm lemon water, and/or tea.  Drink plenty of fluids  Follow-up with PCP  Return to clinic or go to the ED if symptoms worsen or fail to improve, or if the patient should develop worsening/increasing cough, congestion, ear pain, sore throat, shortness of breath, wheezing, chest pain, fever/chills, and/or any concerning symptoms.    Discussed plan with the patient, and she agrees to the above.     I personally reviewed prior external notes and test results pertinent to today's visit.  I have independently reviewed and interpreted all  diagnostics ordered during this urgent care visit.       Please note that this dictation was created using voice recognition software. I have made every reasonable attempt to correct obvious errors, but I expect that there may be errors of grammar and possibly content that I did not discover before finalizing the note.     This note was electronically signed by Gail Cabrera PA-C

## 2022-05-13 ENCOUNTER — HOSPITAL ENCOUNTER (OUTPATIENT)
Facility: MEDICAL CENTER | Age: 32
End: 2022-05-13
Attending: NURSE PRACTITIONER
Payer: COMMERCIAL

## 2022-05-13 ENCOUNTER — OFFICE VISIT (OUTPATIENT)
Dept: URGENT CARE | Facility: PHYSICIAN GROUP | Age: 32
End: 2022-05-13
Payer: COMMERCIAL

## 2022-05-13 VITALS
DIASTOLIC BLOOD PRESSURE: 70 MMHG | HEART RATE: 90 BPM | BODY MASS INDEX: 22.08 KG/M2 | HEIGHT: 62 IN | OXYGEN SATURATION: 98 % | SYSTOLIC BLOOD PRESSURE: 110 MMHG | TEMPERATURE: 97.1 F | WEIGHT: 120 LBS | RESPIRATION RATE: 16 BRPM

## 2022-05-13 DIAGNOSIS — J34.89 NASAL CONGESTION WITH RHINORRHEA: ICD-10-CM

## 2022-05-13 DIAGNOSIS — J02.9 SORE THROAT: ICD-10-CM

## 2022-05-13 DIAGNOSIS — R09.81 NASAL CONGESTION WITH RHINORRHEA: ICD-10-CM

## 2022-05-13 DIAGNOSIS — J01.90 ACUTE SINUSITIS, RECURRENCE NOT SPECIFIED, UNSPECIFIED LOCATION: ICD-10-CM

## 2022-05-13 LAB
INT CON NEG: NORMAL
INT CON POS: NORMAL
S PYO AG THROAT QL: NEGATIVE

## 2022-05-13 PROCEDURE — 87880 STREP A ASSAY W/OPTIC: CPT | Performed by: NURSE PRACTITIONER

## 2022-05-13 PROCEDURE — 99213 OFFICE O/P EST LOW 20 MIN: CPT | Performed by: NURSE PRACTITIONER

## 2022-05-13 PROCEDURE — 87070 CULTURE OTHR SPECIMN AEROBIC: CPT

## 2022-05-13 RX ORDER — LIDOCAINE HYDROCHLORIDE 20 MG/ML
5 SOLUTION OROPHARYNGEAL EVERY 6 HOURS PRN
Qty: 140 ML | Refills: 0 | Status: SHIPPED | OUTPATIENT
Start: 2022-05-13 | End: 2022-05-20

## 2022-05-13 RX ORDER — AMOXICILLIN AND CLAVULANATE POTASSIUM 875; 125 MG/1; MG/1
1 TABLET, FILM COATED ORAL 2 TIMES DAILY
Qty: 14 TABLET | Refills: 0 | Status: SHIPPED | OUTPATIENT
Start: 2022-05-13 | End: 2022-05-20

## 2022-05-13 ASSESSMENT — ENCOUNTER SYMPTOMS
COUGH: 0
WEAKNESS: 0
WHEEZING: 0
SINUS PAIN: 1
DIARRHEA: 0
CHILLS: 0
ABDOMINAL PAIN: 0
NECK PAIN: 0
MYALGIAS: 0
NAUSEA: 0
SORE THROAT: 1
SHORTNESS OF BREATH: 0
FEVER: 0
DIZZINESS: 0
EYE DISCHARGE: 0
EYE REDNESS: 0
HEADACHES: 0
VOMITING: 0
CONSTIPATION: 0

## 2022-05-14 NOTE — PROGRESS NOTES
Subjective     Sveta Jj is a 32 y.o. female who presents with Pharyngitis (X 5 days, sore throat, white spots, tested negative for strep x 2 days ago.  Pain is getting worse. )            HPI  States experiencing sore throat x 5 days, seen in  for this 4 days ago. Negative: Strep, Flu and COVID. Using Dayquil, intermittent Naproxen, salt water gargle twice daily. States white spot to left tonsil. Nasal congestion, facial pressure, no ear pain or cough. No nasal spray or rinse.     PMH:  has no past medical history on file.  MEDS:   Current Outpatient Medications:   •  lidocaine (XYLOCAINE) 2 % Solution, Take 5 mL by mouth as needed in the morning and 5 mL as needed at noon and 5 mL as needed in the evening and 5 mL as needed before bedtime for Throat/Mouth Pain. Do all this for up to 7 days. May dilute with water 1:1, gargle and spit out after salt water gargle., Disp: 140 mL, Rfl: 0  •  amphetamine-dextroamphetamine (ADDERALL) 15 MG tablet, Take 1 Tablet by mouth 2 times a day., Disp: , Rfl:   •  amphetamine-dextroamphetamine (ADDERALL XR) 15 MG XR capsule, 2 times a day., Disp: , Rfl:   ALLERGIES:   Allergies   Allergen Reactions   • Sulfa Drugs      SURGHX:   Past Surgical History:   Procedure Laterality Date   • PRIMARY C SECTION  9/7/2018    Procedure: PRIMARY C SECTION;  Surgeon: Braeden Blas M.D.;  Location: LABOR AND DELIVERY;  Service: Labor and Delivery   • OTHER      breast augmentation     SOCHX:  reports that she has never smoked. She has never used smokeless tobacco. She reports that she does not drink alcohol and does not use drugs.  FH: Family history was reviewed, no pertinent findings to report    Review of Systems   Constitutional: Negative for chills, fever and malaise/fatigue.   HENT: Positive for congestion, sinus pain and sore throat. Negative for ear pain.    Eyes: Negative for discharge and redness.   Respiratory: Negative for cough, shortness of breath and wheezing.   "  Gastrointestinal: Negative for abdominal pain, constipation, diarrhea, nausea and vomiting.   Musculoskeletal: Negative for myalgias and neck pain.   Skin: Negative for itching and rash.   Neurological: Negative for dizziness, weakness and headaches.   Endo/Heme/Allergies: Negative for environmental allergies.   All other systems reviewed and are negative.             Objective     /70 (BP Location: Right arm, Patient Position: Sitting, BP Cuff Size: Adult)   Pulse 90   Temp 36.2 °C (97.1 °F) (Temporal)   Resp 16   Ht 1.575 m (5' 2\")   Wt 54.4 kg (120 lb)   SpO2 98%   BMI 21.95 kg/m²      Physical Exam  Vitals reviewed.   Constitutional:       General: She is awake. She is not in acute distress.     Appearance: Normal appearance. She is well-developed. She is not ill-appearing, toxic-appearing or diaphoretic.   HENT:      Head: Normocephalic.      Right Ear: Ear canal and external ear normal.      Left Ear: Ear canal and external ear normal. A middle ear effusion is present.      Nose: Nasal tenderness, mucosal edema, congestion and rhinorrhea present.      Right Turbinates: Swollen.      Left Turbinates: Swollen.      Mouth/Throat:      Lips: Pink.      Mouth: Mucous membranes are moist.      Pharynx: Uvula midline. Pharyngeal swelling and posterior oropharyngeal erythema present. No oropharyngeal exudate or uvula swelling.      Tonsils: No tonsillar exudate or tonsillar abscesses. 1+ on the right. 1+ on the left.   Eyes:      Conjunctiva/sclera: Conjunctivae normal.      Pupils: Pupils are equal, round, and reactive to light.   Cardiovascular:      Rate and Rhythm: Normal rate.   Pulmonary:      Effort: Pulmonary effort is normal. No tachypnea, accessory muscle usage or respiratory distress.   Musculoskeletal:         General: Normal range of motion.      Cervical back: Normal range of motion and neck supple.   Skin:     General: Skin is warm and dry.   Neurological:      Mental Status: She is alert " and oriented to person, place, and time.   Psychiatric:         Attention and Perception: Attention normal.         Mood and Affect: Mood normal.         Speech: Speech normal.         Behavior: Behavior normal. Behavior is cooperative.                             Assessment & Plan        1. Sore throat    - CULTURE THROAT; Future  - POCT Rapid Strep A  - lidocaine (XYLOCAINE) 2 % Solution; Take 5 mL by mouth as needed in the morning and 5 mL as needed at noon and 5 mL as needed in the evening and 5 mL as needed before bedtime for Throat/Mouth Pain. Do all this for up to 7 days. May dilute with water 1:1, gargle and spit out after salt water gargle.  Dispense: 140 mL; Refill: 0    2. Nasal congestion with rhinorrhea      3. Acute sinusitis, recurrence not specified, unspecified location    - amoxicillin-clavulanate (AUGMENTIN) 875-125 MG Tab; Take 1 Tablet by mouth in the morning and 1 Tablet in the evening. Do all this for 7 days.  Dispense: 14 Tablet; Refill: 0  -Increase water intake  -Get rest  -May use Ibuprofen/Tylenol as needed for any fever, body aches or throat pain  -May take longer acting antihistamine for seasonal allergy symptoms (chose one like Claritin/Zyrtec/Allegra without decongestant) x 1 week then as needed   -May use over the counter saline nasal spray for nasal congestion up to 4x/day  -May use over the counter Flonase or Nasocort for allergen nasal congestion as needed x 1 week then as needed   -May gargle with salt water as needed for throat discomfort up to 4x/day (1 tsp salt in one cup warm water)  -May drink smoothies for nutrition if too painful to swallow solid foods  -Monitor for worsening or persistent symptoms, increased facial pressure, dizziness, fever, productive cough, shortness of breath and chest pain/tightness- need re-evaluation    MyChart release of throat culture

## 2022-05-16 ENCOUNTER — OFFICE VISIT (OUTPATIENT)
Dept: MEDICAL GROUP | Facility: CLINIC | Age: 32
End: 2022-05-16
Payer: COMMERCIAL

## 2022-05-16 ENCOUNTER — HOSPITAL ENCOUNTER (OUTPATIENT)
Facility: MEDICAL CENTER | Age: 32
End: 2022-05-16
Attending: STUDENT IN AN ORGANIZED HEALTH CARE EDUCATION/TRAINING PROGRAM
Payer: COMMERCIAL

## 2022-05-16 VITALS
HEIGHT: 62 IN | OXYGEN SATURATION: 95 % | DIASTOLIC BLOOD PRESSURE: 74 MMHG | TEMPERATURE: 98 F | HEART RATE: 90 BPM | SYSTOLIC BLOOD PRESSURE: 112 MMHG | WEIGHT: 120 LBS | RESPIRATION RATE: 16 BRPM | BODY MASS INDEX: 22.08 KG/M2

## 2022-05-16 DIAGNOSIS — Z01.419 WELL WOMAN EXAM: ICD-10-CM

## 2022-05-16 LAB
BACTERIA SPEC RESP CULT: NORMAL
SIGNIFICANT IND 70042: NORMAL
SITE SITE: NORMAL
SOURCE SOURCE: NORMAL

## 2022-05-16 PROCEDURE — 87624 HPV HI-RISK TYP POOLED RSLT: CPT

## 2022-05-16 PROCEDURE — 88175 CYTOPATH C/V AUTO FLUID REDO: CPT

## 2022-05-16 PROCEDURE — 99395 PREV VISIT EST AGE 18-39: CPT | Mod: GC | Performed by: STUDENT IN AN ORGANIZED HEALTH CARE EDUCATION/TRAINING PROGRAM

## 2022-05-16 NOTE — PROGRESS NOTES
"Subjective:     CC: repeat pap    HPI:   Sveta presents today with :    Problem   Well Woman Exam    Patient is here for repeat Pap smear.  Unfortunately, her previous Pap smear was inadequate.  We will plan for repeat today.         Current Outpatient Medications Ordered in Epic   Medication Sig Dispense Refill   • lidocaine (XYLOCAINE) 2 % Solution Take 5 mL by mouth as needed in the morning and 5 mL as needed at noon and 5 mL as needed in the evening and 5 mL as needed before bedtime for Throat/Mouth Pain. Do all this for up to 7 days. May dilute with water 1:1, gargle and spit out after salt water gargle. 140 mL 0   • amoxicillin-clavulanate (AUGMENTIN) 875-125 MG Tab Take 1 Tablet by mouth in the morning and 1 Tablet in the evening. Do all this for 7 days. 14 Tablet 0   • amphetamine-dextroamphetamine (ADDERALL) 15 MG tablet Take 1 Tablet by mouth 2 times a day.     • amphetamine-dextroamphetamine (ADDERALL XR) 15 MG XR capsule 2 times a day.       No current [facility]-ordered facility-administered medications on file.         ROS:  Gen: no fevers/chills, no changes in weight  Pulm: no sob, no cough  CV: no chest pain, no palpitations  GI: no nausea/vomiting, no diarrhea    Objective:     Exam:  /74   Pulse 90   Temp 36.7 °C (98 °F) (Temporal)   Resp 16   Ht 1.575 m (5' 2\")   Wt 54.4 kg (120 lb)   SpO2 95%   BMI 21.95 kg/m²  Body mass index is 21.95 kg/m².    Gen: Alert and oriented, No apparent distress.  GYN exam:  External genitalia: Normal no lesions  Vagina: Pink, moist, well rugated, cervix visualized, no lesions, scant vaginal discharge, non-foul-smelling.    Labs: reviewed     Assessment & Plan:     32 y.o. female with the following -     Problem List Items Addressed This Visit     Well woman exam     Repeat Pap smear performed today.           Relevant Orders    Pap +  HPV + Reflex Genotyping 16/18/45            No follow-ups on file.    Lyubov Mitchell MD   PGY2        "

## 2022-05-18 LAB
CYTOLOGY REG CYTOL: NORMAL
HPV HR 12 DNA CVX QL NAA+PROBE: NEGATIVE
HPV16 DNA SPEC QL NAA+PROBE: NEGATIVE
HPV18 DNA SPEC QL NAA+PROBE: NEGATIVE
SPECIMEN SOURCE: NORMAL

## 2022-05-27 ENCOUNTER — APPOINTMENT (OUTPATIENT)
Dept: LAB | Facility: MEDICAL CENTER | Age: 32
End: 2022-05-27
Payer: COMMERCIAL

## 2022-12-27 DIAGNOSIS — R21 RASH: ICD-10-CM

## 2023-01-27 ENCOUNTER — HOSPITAL ENCOUNTER (OUTPATIENT)
Dept: LAB | Facility: MEDICAL CENTER | Age: 33
End: 2023-01-27
Attending: STUDENT IN AN ORGANIZED HEALTH CARE EDUCATION/TRAINING PROGRAM
Payer: COMMERCIAL

## 2023-01-27 DIAGNOSIS — Z01.419 WELL WOMAN EXAM: ICD-10-CM

## 2023-01-27 DIAGNOSIS — R25.2 LEG CRAMPING: ICD-10-CM

## 2023-01-27 LAB
ALBUMIN SERPL BCP-MCNC: 4.6 G/DL (ref 3.2–4.9)
ALBUMIN/GLOB SERPL: 1.8 G/DL
ALP SERPL-CCNC: 77 U/L (ref 30–99)
ALT SERPL-CCNC: 15 U/L (ref 2–50)
ANION GAP SERPL CALC-SCNC: 12 MMOL/L (ref 7–16)
AST SERPL-CCNC: 22 U/L (ref 12–45)
BASOPHILS # BLD AUTO: 0.5 % (ref 0–1.8)
BASOPHILS # BLD: 0.05 K/UL (ref 0–0.12)
BILIRUB SERPL-MCNC: 0.8 MG/DL (ref 0.1–1.5)
BUN SERPL-MCNC: 13 MG/DL (ref 8–22)
CALCIUM ALBUM COR SERPL-MCNC: 8.9 MG/DL (ref 8.5–10.5)
CALCIUM SERPL-MCNC: 9.4 MG/DL (ref 8.5–10.5)
CHLORIDE SERPL-SCNC: 102 MMOL/L (ref 96–112)
CO2 SERPL-SCNC: 22 MMOL/L (ref 20–33)
CREAT SERPL-MCNC: 0.6 MG/DL (ref 0.5–1.4)
EOSINOPHIL # BLD AUTO: 0.09 K/UL (ref 0–0.51)
EOSINOPHIL NFR BLD: 0.9 % (ref 0–6.9)
ERYTHROCYTE [DISTWIDTH] IN BLOOD BY AUTOMATED COUNT: 48.4 FL (ref 35.9–50)
FASTING STATUS PATIENT QL REPORTED: NORMAL
GFR SERPLBLD CREATININE-BSD FMLA CKD-EPI: 121 ML/MIN/1.73 M 2
GLOBULIN SER CALC-MCNC: 2.6 G/DL (ref 1.9–3.5)
GLUCOSE SERPL-MCNC: 94 MG/DL (ref 65–99)
HCT VFR BLD AUTO: 43.7 % (ref 37–47)
HGB BLD-MCNC: 14.1 G/DL (ref 12–16)
IMM GRANULOCYTES # BLD AUTO: 0.03 K/UL (ref 0–0.11)
IMM GRANULOCYTES NFR BLD AUTO: 0.3 % (ref 0–0.9)
LYMPHOCYTES # BLD AUTO: 2.09 K/UL (ref 1–4.8)
LYMPHOCYTES NFR BLD: 20.3 % (ref 22–41)
MAGNESIUM SERPL-MCNC: 1.8 MG/DL (ref 1.5–2.5)
MCH RBC QN AUTO: 30.8 PG (ref 27–33)
MCHC RBC AUTO-ENTMCNC: 32.3 G/DL (ref 33.6–35)
MCV RBC AUTO: 95.4 FL (ref 81.4–97.8)
MONOCYTES # BLD AUTO: 0.58 K/UL (ref 0–0.85)
MONOCYTES NFR BLD AUTO: 5.6 % (ref 0–13.4)
NEUTROPHILS # BLD AUTO: 7.45 K/UL (ref 2–7.15)
NEUTROPHILS NFR BLD: 72.4 % (ref 44–72)
NRBC # BLD AUTO: 0 K/UL
NRBC BLD-RTO: 0 /100 WBC
PLATELET # BLD AUTO: 266 K/UL (ref 164–446)
PMV BLD AUTO: 10.4 FL (ref 9–12.9)
POTASSIUM SERPL-SCNC: 4.5 MMOL/L (ref 3.6–5.5)
PROT SERPL-MCNC: 7.2 G/DL (ref 6–8.2)
RBC # BLD AUTO: 4.58 M/UL (ref 4.2–5.4)
SODIUM SERPL-SCNC: 136 MMOL/L (ref 135–145)
WBC # BLD AUTO: 10.3 K/UL (ref 4.8–10.8)

## 2023-01-27 PROCEDURE — 80053 COMPREHEN METABOLIC PANEL: CPT

## 2023-01-27 PROCEDURE — 36415 COLL VENOUS BLD VENIPUNCTURE: CPT

## 2023-01-27 PROCEDURE — 83735 ASSAY OF MAGNESIUM: CPT

## 2023-01-27 PROCEDURE — 85025 COMPLETE CBC W/AUTO DIFF WBC: CPT

## 2023-03-07 ENCOUNTER — OFFICE VISIT (OUTPATIENT)
Dept: URGENT CARE | Facility: PHYSICIAN GROUP | Age: 33
End: 2023-03-07
Payer: COMMERCIAL

## 2023-03-07 VITALS
DIASTOLIC BLOOD PRESSURE: 50 MMHG | SYSTOLIC BLOOD PRESSURE: 96 MMHG | WEIGHT: 125 LBS | RESPIRATION RATE: 18 BRPM | OXYGEN SATURATION: 98 % | BODY MASS INDEX: 23 KG/M2 | TEMPERATURE: 97.8 F | HEART RATE: 94 BPM | HEIGHT: 62 IN

## 2023-03-07 DIAGNOSIS — J02.0 ACUTE STREPTOCOCCAL PHARYNGITIS: ICD-10-CM

## 2023-03-07 PROCEDURE — 99213 OFFICE O/P EST LOW 20 MIN: CPT | Performed by: PHYSICIAN ASSISTANT

## 2023-03-07 RX ORDER — AMOXICILLIN 500 MG/1
500 CAPSULE ORAL 2 TIMES DAILY
Qty: 20 CAPSULE | Refills: 0 | Status: SHIPPED | OUTPATIENT
Start: 2023-03-07 | End: 2023-03-17

## 2023-03-07 RX ORDER — AMOXICILLIN 500 MG/1
500 CAPSULE ORAL 2 TIMES DAILY
Qty: 20 CAPSULE | Refills: 0 | Status: SHIPPED | OUTPATIENT
Start: 2023-03-07 | End: 2023-03-07

## 2023-03-07 RX ORDER — FLUTICASONE PROPIONATE 50 MCG
1 SPRAY, SUSPENSION (ML) NASAL DAILY
Qty: 16 G | Refills: 0 | Status: SHIPPED | OUTPATIENT
Start: 2023-03-07 | End: 2023-03-07

## 2023-03-07 RX ORDER — FLUTICASONE PROPIONATE 50 MCG
1 SPRAY, SUSPENSION (ML) NASAL DAILY
Qty: 16 G | Refills: 0 | Status: SHIPPED | OUTPATIENT
Start: 2023-03-07 | End: 2024-01-08

## 2023-03-07 RX ORDER — LORAZEPAM 1 MG/1
TABLET ORAL
COMMUNITY
Start: 2023-01-12

## 2023-03-07 RX ORDER — CLOBETASOL PROPIONATE 0.46 MG/ML
SOLUTION TOPICAL
COMMUNITY
Start: 2023-01-16 | End: 2023-09-27

## 2023-03-07 RX ORDER — BENZONATATE 100 MG/1
100 CAPSULE ORAL 3 TIMES DAILY PRN
Qty: 60 CAPSULE | Refills: 0 | Status: SHIPPED | OUTPATIENT
Start: 2023-03-07 | End: 2023-03-07

## 2023-03-07 RX ORDER — KETOCONAZOLE 20 MG/ML
SHAMPOO TOPICAL
COMMUNITY
Start: 2023-02-15 | End: 2023-09-27

## 2023-03-07 RX ORDER — BENZONATATE 100 MG/1
100 CAPSULE ORAL 3 TIMES DAILY PRN
Qty: 60 CAPSULE | Refills: 0 | Status: SHIPPED | OUTPATIENT
Start: 2023-03-07 | End: 2023-06-01

## 2023-03-07 RX ORDER — CLINDAMYCIN PHOSPHATE 10 UG/ML
LOTION TOPICAL
COMMUNITY
Start: 2023-02-15 | End: 2023-09-27

## 2023-03-07 RX ORDER — ESCITALOPRAM OXALATE 20 MG/1
20 TABLET ORAL
COMMUNITY
Start: 2023-01-31 | End: 2023-09-27

## 2023-03-07 ASSESSMENT — ENCOUNTER SYMPTOMS
NAUSEA: 0
DIZZINESS: 0
COUGH: 1
EYE DISCHARGE: 0
SINUS PAIN: 0
EYE PAIN: 0
SORE THROAT: 1
FEVER: 0
DIAPHORESIS: 0
WHEEZING: 0
ABDOMINAL PAIN: 0
VOMITING: 0
SHORTNESS OF BREATH: 0
CONSTIPATION: 0
HEADACHES: 0
EYE REDNESS: 0
CHILLS: 1
DIARRHEA: 0

## 2023-03-07 ASSESSMENT — FIBROSIS 4 INDEX: FIB4 SCORE: 0.73

## 2023-03-08 NOTE — PROGRESS NOTES
"  Subjective:     Sveta Jj  is a 33 y.o. female who presents for Nasal Congestion (Onset 1 week/), Chills (Onset 1 week/), Cough (Productive/2 weeks), Pharyngitis (Exposed to strep/On and off 1 week), Body Aches (Onset 1 week), and Shortness of Breath (Onset 1 week/)       She presents today with nasal congestion, cough, pharyngitis, general malaise, shortness of breath and chills that have been present over the last 1 week.  Does note that she was a close contact to a person whom did recently test positive for strep pharyngitis, she is requesting a strep test today.  No pain with swallowing, denies dysphagia.  No previous history of cardiopulmonary pathology.  No overt chest pain, no vomiting, no abdominal pain, no diarrhea.  Has been using over-the-counter medications for symptoms.     Review of Systems   Constitutional:  Positive for chills and malaise/fatigue. Negative for diaphoresis and fever.   HENT:  Positive for congestion and sore throat. Negative for ear discharge and sinus pain.    Eyes:  Negative for pain, discharge and redness.   Respiratory:  Positive for cough. Negative for shortness of breath and wheezing.    Cardiovascular:  Negative for chest pain.   Gastrointestinal:  Negative for abdominal pain, constipation, diarrhea, nausea and vomiting.   Genitourinary:  Negative for dysuria, frequency and urgency.   Neurological:  Negative for dizziness and headaches.    Allergies   Allergen Reactions    Sulfa Drugs Hives     History reviewed. No pertinent past medical history.     Objective:   BP 96/50 (BP Location: Right arm, Patient Position: Sitting, BP Cuff Size: Adult long)   Pulse 94   Temp 36.6 °C (97.8 °F) (Temporal)   Resp 18   Ht 1.575 m (5' 2\")   Wt 56.7 kg (125 lb)   SpO2 98%   BMI 22.86 kg/m²   Physical Exam  Vitals and nursing note reviewed.   Constitutional:       General: She is not in acute distress.     Appearance: Normal appearance. She is not ill-appearing, " toxic-appearing or diaphoretic.   HENT:      Head: Normocephalic.      Right Ear: Tympanic membrane, ear canal and external ear normal. There is no impacted cerumen.      Left Ear: Tympanic membrane, ear canal and external ear normal. There is no impacted cerumen.      Nose: No congestion or rhinorrhea.      Mouth/Throat:      Mouth: Mucous membranes are moist.      Pharynx: Posterior oropharyngeal erythema present. No oropharyngeal exudate.   Eyes:      General:         Right eye: No discharge.         Left eye: No discharge.      Conjunctiva/sclera: Conjunctivae normal.   Cardiovascular:      Rate and Rhythm: Normal rate and regular rhythm.   Pulmonary:      Effort: Pulmonary effort is normal. No respiratory distress.      Breath sounds: Normal breath sounds. No stridor. No wheezing or rhonchi.   Musculoskeletal:      Cervical back: Neck supple.   Lymphadenopathy:      Cervical: No cervical adenopathy.   Neurological:      General: No focal deficit present.      Mental Status: She is alert and oriented to person, place, and time.   Psychiatric:         Mood and Affect: Mood normal.         Behavior: Behavior normal.         Thought Content: Thought content normal.         Judgment: Judgment normal.           Diagnostic testing:    POC strep-positive    Assessment/Plan:     Encounter Diagnoses   Name Primary?    Acute streptococcal pharyngitis           Plan for care for today's complaint includes amoxicillin for strep pharyngitis.  Did discuss appropriate hygiene techniques to prevent coinfection or reinfection.  Flonase and Tessalon Perles for additional symptom support.  Continue to monitor symptoms and return to urgent care or follow-up with primary care provider if symptoms remain ongoing.  Follow-up in the emergency department if symptoms become severe, ER precautions discussed in office today..  Prescription for amoxicillin, Flonase, Tessalon Perles provided.    See AVS Instructions below for written  guidance provided to patient on after-visit management and care in addition to our verbal discussion during the visit.    Please note that this dictation was created using voice recognition software. I have attempted to correct all errors, but there may be sound-alike, spelling, grammar and possibly content errors that I did not discover before finalizing the note.    Bryan De Anda PA-C

## 2023-06-01 ENCOUNTER — OFFICE VISIT (OUTPATIENT)
Dept: URGENT CARE | Facility: PHYSICIAN GROUP | Age: 33
End: 2023-06-01
Payer: COMMERCIAL

## 2023-06-01 VITALS
RESPIRATION RATE: 16 BRPM | OXYGEN SATURATION: 97 % | WEIGHT: 120 LBS | TEMPERATURE: 97 F | BODY MASS INDEX: 21.26 KG/M2 | SYSTOLIC BLOOD PRESSURE: 110 MMHG | DIASTOLIC BLOOD PRESSURE: 60 MMHG | HEART RATE: 78 BPM | HEIGHT: 63 IN

## 2023-06-01 DIAGNOSIS — B34.9 VIRAL ILLNESS: ICD-10-CM

## 2023-06-01 LAB
FLUAV RNA SPEC QL NAA+PROBE: NEGATIVE
FLUBV RNA SPEC QL NAA+PROBE: NEGATIVE
RSV RNA SPEC QL NAA+PROBE: NEGATIVE
S PYO DNA SPEC NAA+PROBE: NOT DETECTED
SARS-COV-2 RNA RESP QL NAA+PROBE: NEGATIVE

## 2023-06-01 PROCEDURE — 99213 OFFICE O/P EST LOW 20 MIN: CPT | Performed by: STUDENT IN AN ORGANIZED HEALTH CARE EDUCATION/TRAINING PROGRAM

## 2023-06-01 PROCEDURE — 3074F SYST BP LT 130 MM HG: CPT | Performed by: STUDENT IN AN ORGANIZED HEALTH CARE EDUCATION/TRAINING PROGRAM

## 2023-06-01 PROCEDURE — 0241U POCT CEPHEID COV-2, FLU A/B, RSV - PCR: CPT | Performed by: STUDENT IN AN ORGANIZED HEALTH CARE EDUCATION/TRAINING PROGRAM

## 2023-06-01 PROCEDURE — 3078F DIAST BP <80 MM HG: CPT | Performed by: STUDENT IN AN ORGANIZED HEALTH CARE EDUCATION/TRAINING PROGRAM

## 2023-06-01 PROCEDURE — 87651 STREP A DNA AMP PROBE: CPT | Performed by: STUDENT IN AN ORGANIZED HEALTH CARE EDUCATION/TRAINING PROGRAM

## 2023-06-01 ASSESSMENT — FIBROSIS 4 INDEX: FIB4 SCORE: 0.73

## 2023-06-02 NOTE — PROGRESS NOTES
"Subjective:   Sveta Jj is a 33 y.o. female who presents for Sore Throat (Headache, nasal congestion, exposed to strep and COVID, x 2 days)      HPI:  Pleasant 33-year-old female presents urgent care for 2 days of a sore throat, nasal congestion, headache, fatigue.  She states that she was recently exposed to strep and COVID at work.  No other sick contacts.  She is able to maintain adequate oral intake fluids and solids.  Denies fever, chills, nausea, vomiting, abdominal pain, diarrhea, dizziness, chest pain, palpitations, cough, shortness of breath, or ear pain.    Medications:    amphetamine-dextroamphetamine  CLINDAMYCIN PHOSPHATE(TOPICAL) Lotn  clobetasol  escitalopram  fluticasone  ketoconazole  LORazepam Tabs    Allergies: Sulfa drugs    Problem List: Sveta Jj does not have any pertinent problems on file.    Surgical History:  Past Surgical History:   Procedure Laterality Date    PRIMARY C SECTION  9/7/2018    Procedure: PRIMARY C SECTION;  Surgeon: Braeden Blas M.D.;  Location: LABOR AND DELIVERY;  Service: Labor and Delivery    OTHER      breast augmentation       Past Social Hx: Sveta Jj  reports that she has never smoked. She has never used smokeless tobacco. She reports that she does not drink alcohol and does not use drugs.     Past Family Hx:  Sveta Jj family history is not on file.     Problem list, medications, and allergies reviewed by myself today in Epic.     Objective:     /60   Pulse 78   Temp 36.1 °C (97 °F)   Resp 16   Ht 1.6 m (5' 3\")   Wt 54.4 kg (120 lb)   SpO2 97%   BMI 21.26 kg/m²     Physical Exam  Vitals reviewed.   Constitutional:       General: She is not in acute distress.     Appearance: Normal appearance.   HENT:      Head: Normocephalic.      Right Ear: Tympanic membrane, ear canal and external ear normal.      Left Ear: Tympanic membrane, ear canal and external ear normal.      Nose: Congestion present.     "  Mouth/Throat:      Mouth: Mucous membranes are moist.      Pharynx: Uvula midline. Posterior oropharyngeal erythema present. No oropharyngeal exudate.      Tonsils: No tonsillar exudate or tonsillar abscesses. 1+ on the right. 1+ on the left.   Eyes:      Conjunctiva/sclera: Conjunctivae normal.      Pupils: Pupils are equal, round, and reactive to light.   Cardiovascular:      Rate and Rhythm: Normal rate and regular rhythm.      Pulses: Normal pulses.      Heart sounds: Normal heart sounds. No murmur heard.  Pulmonary:      Effort: Pulmonary effort is normal. No respiratory distress.      Breath sounds: Normal breath sounds. No stridor. No wheezing, rhonchi or rales.   Musculoskeletal:      Cervical back: Normal range of motion.   Lymphadenopathy:      Cervical: Cervical adenopathy present.   Neurological:      Mental Status: She is alert.       Lab Results/POC Test Results   Results for orders placed or performed in visit on 06/01/23   POCT GROUP A STREP, PCR   Result Value Ref Range    POC Group A Strep, PCR Not Detected Not Detected, Invalid   POCT CoV-2, Flu A/B, RSV by PCR   Result Value Ref Range    SARS-CoV-2 by PCR Negative Negative, Invalid    Influenza virus A RNA Negative Negative, Invalid    Influenza virus B, PCR Negative Negative, Invalid    RSV, PCR Negative Negative, Invalid             Assessment/Plan:     Diagnosis and associated orders:     1. Viral illness  POCT GROUP A STREP, PCR    POCT CoV-2, Flu A/B, RSV by PCR         Comments/MDM:     PCR strep, COVID-19, influenza, and RSV negative.  Patient's presentation physical exam findings are consistent with viral illness.  Discussed that this is self-limited should resolve within the first 7 to 14 days.  Continue OTC cold medication.  No indication for antibiotics at this time.  Pulmonary exam shows no abnormal findings.  No signs of pneumonia.  No otitis media bilaterally.  Presentation is not consistent with bacterial sinus infection.  Vitals  all within normal limits.  Patient is well-appearing and nontoxic.  ED/return precautions given.  Continue ibuprofen/Tylenol as needed, warm salt water gargles, humidifier, nasal saline spray, plenty of oral hydration.         Differential diagnosis, natural history, supportive care, and indications for immediate follow-up discussed.    Advised the patient to follow-up with the primary care physician for recheck, reevaluation, and consideration of further management.    Please note that this dictation was created using voice recognition software. I have made a reasonable attempt to correct obvious errors, but I expect that there are errors of grammar and possibly content that I did not discover before finalizing the note.    Electronically signed by Jez Johnston PA-C.

## 2023-06-23 DIAGNOSIS — Z01.419 WELL WOMAN EXAM: ICD-10-CM

## 2023-06-23 DIAGNOSIS — Z11.3 SCREENING FOR STD (SEXUALLY TRANSMITTED DISEASE): ICD-10-CM

## 2023-09-27 ENCOUNTER — TELEMEDICINE (OUTPATIENT)
Dept: TELEHEALTH | Facility: TELEMEDICINE | Age: 33
End: 2023-09-27
Payer: COMMERCIAL

## 2023-09-27 DIAGNOSIS — B37.9 ANTIBIOTIC-INDUCED YEAST INFECTION: ICD-10-CM

## 2023-09-27 DIAGNOSIS — T36.95XA ANTIBIOTIC-INDUCED YEAST INFECTION: ICD-10-CM

## 2023-09-27 DIAGNOSIS — N76.0 BACTERIAL VAGINOSIS: ICD-10-CM

## 2023-09-27 DIAGNOSIS — B96.89 BACTERIAL VAGINOSIS: ICD-10-CM

## 2023-09-27 PROCEDURE — 99213 OFFICE O/P EST LOW 20 MIN: CPT | Mod: 95 | Performed by: NURSE PRACTITIONER

## 2023-09-27 RX ORDER — CLINDAMYCIN HYDROCHLORIDE 300 MG/1
300 CAPSULE ORAL 3 TIMES DAILY
Qty: 21 CAPSULE | Refills: 0 | Status: SHIPPED | OUTPATIENT
Start: 2023-09-27 | End: 2023-10-04

## 2023-09-27 RX ORDER — FLUCONAZOLE 150 MG/1
150 TABLET ORAL
Qty: 1 TABLET | Refills: 0 | Status: SHIPPED | OUTPATIENT
Start: 2023-09-27 | End: 2024-01-08

## 2023-09-27 NOTE — PROGRESS NOTES
Virtual Visit: Established Patient   This visit was conducted via Zoom using secure and encrypted videoconferencing technology.   The patient was in their home in the [facility].    The patient's identity was confirmed and verbal consent was obtained for this virtual visit.    Subjective:   CC: No chief complaint on file.    Sveta Jj is a 33 y.o. female presenting for evaluation and management of: bacterial vaginosis symptoms.  She reports new onset of thin, watery white vaginal discharge with a strong odor.  She noted symptoms after taking a lot of recent bubble baths.  She denies any fever, chills, abdominal pain, genital lesions or suspected exposure to STI.  She has had BV historically with similar symptoms.  She would prefer not to take oral metronidazole as she will be going on vacation and may partake in alcohol consumption this weekend.  She also reports that she is prone to yeast infections when she takes antibiotics and is requesting Diflucan in case she gets one.  Not pregnant or breastfeeding.      ROS   As discussed above.      Current medicines (including changes today)  Current Outpatient Medications   Medication Sig Dispense Refill    clindamycin (CLEOCIN) 300 MG Cap Take 1 Capsule by mouth 3 times a day for 7 days. 21 Capsule 0    fluconazole (DIFLUCAN) 150 MG tablet Take 1 Tablet by mouth one time as needed (yeast infection symptoms) for up to 1 dose. 1 Tablet 0    LORazepam (ATIVAN) 1 MG Tab TAKE 1 TABLET BY MOUTH ONCE A DAY AS NEEDED (FOR ACUTE PANIC EPISODES)      fluticasone (FLONASE) 50 MCG/ACT nasal spray Administer 1 Spray into affected nostril(S) every day. 16 g 0    amphetamine-dextroamphetamine (ADDERALL) 15 MG tablet Take 1 Tablet by mouth 2 times a day.       No current facility-administered medications for this visit.       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    Physical Exam:  Constitutional: Alert, no distress, well-groomed.  Skin: No rashes in visible areas.  Eye: Round. Conjunctiva clear, lids normal. No icterus.   ENMT: Lips pink without lesions, good dentition, moist mucous membranes. Phonation normal.  Neck: Moves freely without pain.  Respiratory: Unlabored respiratory effort, no cough or audible wheeze  Psych: Alert and oriented x3, normal affect and mood.     Assessment and Plan:   The following treatment plan was discussed:     1. Bacterial vaginosis  - clindamycin (CLEOCIN) 300 MG Cap; Take 1 Capsule by mouth 3 times a day for 7 days.  Dispense: 21 Capsule; Refill: 0    2. Antibiotic-induced yeast infection  - fluconazole (DIFLUCAN) 150 MG tablet; Take 1 Tablet by mouth one time as needed (yeast infection symptoms) for up to 1 dose.  Dispense: 1 Tablet; Refill: 0    Discussed exam limitation with Sveta due to virtual visit nature.  Unable to obtain vaginal swab for confirmatory diagnostic testing.  Will treat for presumptive BV and also treat for possible secondary yeast infection after she takes the antibiotics for BV.  Risks and benefits of treatment discussed including C-dif risk.   Take a daily probiotic such as repHresh for vaginal and gut nghia health.  Maintain adequate hydration.   Follow up in 1 week if symptoms persist, sooner if worse.    She verbalized understanding of and agreed with plan of care.     Follow-up: No follow-ups on file.

## 2023-10-09 ENCOUNTER — HOSPITAL ENCOUNTER (OUTPATIENT)
Dept: LAB | Facility: MEDICAL CENTER | Age: 33
End: 2023-10-09
Attending: STUDENT IN AN ORGANIZED HEALTH CARE EDUCATION/TRAINING PROGRAM
Payer: COMMERCIAL

## 2023-10-09 DIAGNOSIS — Z11.3 SCREENING FOR STD (SEXUALLY TRANSMITTED DISEASE): ICD-10-CM

## 2023-10-09 LAB
HIV 1+2 AB+HIV1 P24 AG SERPL QL IA: NORMAL
T PALLIDUM AB SER QL IA: NORMAL

## 2023-10-09 PROCEDURE — 36415 COLL VENOUS BLD VENIPUNCTURE: CPT

## 2023-10-09 PROCEDURE — 87389 HIV-1 AG W/HIV-1&-2 AB AG IA: CPT

## 2023-10-09 PROCEDURE — 87491 CHLMYD TRACH DNA AMP PROBE: CPT

## 2023-10-09 PROCEDURE — 86780 TREPONEMA PALLIDUM: CPT

## 2023-10-09 PROCEDURE — 87591 N.GONORRHOEAE DNA AMP PROB: CPT

## 2023-10-10 LAB
C TRACH DNA SPEC QL NAA+PROBE: NEGATIVE
N GONORRHOEA DNA SPEC QL NAA+PROBE: NEGATIVE
SPECIMEN SOURCE: NORMAL

## 2024-01-08 ENCOUNTER — OFFICE VISIT (OUTPATIENT)
Dept: URGENT CARE | Facility: PHYSICIAN GROUP | Age: 34
End: 2024-01-08
Payer: COMMERCIAL

## 2024-01-08 VITALS
HEART RATE: 89 BPM | BODY MASS INDEX: 24.38 KG/M2 | DIASTOLIC BLOOD PRESSURE: 74 MMHG | TEMPERATURE: 98.6 F | RESPIRATION RATE: 16 BRPM | WEIGHT: 137.57 LBS | HEIGHT: 63 IN | SYSTOLIC BLOOD PRESSURE: 110 MMHG | OXYGEN SATURATION: 98 %

## 2024-01-08 DIAGNOSIS — J02.0 STREP PHARYNGITIS: ICD-10-CM

## 2024-01-08 DIAGNOSIS — R09.81 CHRONIC NASAL CONGESTION: ICD-10-CM

## 2024-01-08 DIAGNOSIS — Z20.818 EXPOSURE TO STREP THROAT: ICD-10-CM

## 2024-01-08 LAB — S PYO DNA SPEC NAA+PROBE: DETECTED

## 2024-01-08 PROCEDURE — 99213 OFFICE O/P EST LOW 20 MIN: CPT | Performed by: FAMILY MEDICINE

## 2024-01-08 PROCEDURE — 3078F DIAST BP <80 MM HG: CPT | Performed by: FAMILY MEDICINE

## 2024-01-08 PROCEDURE — 3074F SYST BP LT 130 MM HG: CPT | Performed by: FAMILY MEDICINE

## 2024-01-08 PROCEDURE — 87651 STREP A DNA AMP PROBE: CPT | Performed by: FAMILY MEDICINE

## 2024-01-08 RX ORDER — AZELASTINE 1 MG/ML
1 SPRAY, METERED NASAL 2 TIMES DAILY
Qty: 30 ML | Refills: 0 | Status: SHIPPED | OUTPATIENT
Start: 2024-01-08

## 2024-01-08 RX ORDER — AMOXICILLIN 500 MG/1
500 CAPSULE ORAL 2 TIMES DAILY
Qty: 20 CAPSULE | Refills: 0 | Status: SHIPPED | OUTPATIENT
Start: 2024-01-08 | End: 2024-01-18

## 2024-01-08 ASSESSMENT — FIBROSIS 4 INDEX: FIB4 SCORE: 0.73

## 2024-01-09 ASSESSMENT — ENCOUNTER SYMPTOMS
FEVER: 0
SORE THROAT: 1

## 2024-01-09 NOTE — PROGRESS NOTES
"Subjective:     Svtea Jj is a 33 y.o. female who presents for Ear Fullness (RT ear has been popping, sore and scratchy throat x1 week. Daughter tested positive for strep. )    HPI  Pt presents for evaluation of an acute problem  Patient with sore throat for about a week  Has had bilateral ear fullness as well  Daughter tested positive for strep pharyngitis and patient concerned that she could have it as well  Has moderate nasal congestion   Has some cough at night   Has pain in the right ear     Review of Systems   Constitutional:  Negative for fever.   HENT:  Positive for sore throat.    Skin:  Negative for rash.       PMH:  has no past medical history on file.  MEDS:   Current Outpatient Medications:     azelastine (ASTELIN) 137 MCG/SPRAY nasal spray, Administer 1 Spray into affected nostril(S) 2 times a day., Disp: 30 mL, Rfl: 0    amoxicillin (AMOXIL) 500 MG Cap, Take 1 Capsule by mouth 2 times a day for 10 days., Disp: 20 Capsule, Rfl: 0    LORazepam (ATIVAN) 1 MG Tab, TAKE 1 TABLET BY MOUTH ONCE A DAY AS NEEDED (FOR ACUTE PANIC EPISODES), Disp: , Rfl:     amphetamine-dextroamphetamine (ADDERALL) 15 MG tablet, Take 1 Tablet by mouth 2 times a day., Disp: , Rfl:   ALLERGIES:   Allergies   Allergen Reactions    Sulfa Drugs Hives     SURGHX:   Past Surgical History:   Procedure Laterality Date    PRIMARY C SECTION  9/7/2018    Procedure: PRIMARY C SECTION;  Surgeon: Braeden Blas M.D.;  Location: LABOR AND DELIVERY;  Service: Labor and Delivery    OTHER      breast augmentation     SOCHX:  reports that she has never smoked. She has never used smokeless tobacco. She reports that she does not drink alcohol and does not use drugs.     Objective:   /74 (BP Location: Right arm, Patient Position: Sitting, BP Cuff Size: Adult)   Pulse 89   Temp 37 °C (98.6 °F) (Temporal)   Resp 16   Ht 1.6 m (5' 3\")   Wt 62.4 kg (137 lb 9.1 oz)   SpO2 98%   BMI 24.37 kg/m²     Physical " Exam  Constitutional:       General: She is not in acute distress.     Appearance: She is well-developed. She is not diaphoretic.   HENT:      Head: Normocephalic and atraumatic.      Right Ear: Tympanic membrane, ear canal and external ear normal.      Left Ear: Tympanic membrane, ear canal and external ear normal.      Nose: Nose normal.      Mouth/Throat:      Mouth: Mucous membranes are moist.      Comments: Moderate erythema in posterior pharynx, no purulent exudate, no unilateral swelling, no uvular deviation  Neck:      Trachea: No tracheal deviation.   Cardiovascular:      Rate and Rhythm: Normal rate and regular rhythm.   Pulmonary:      Effort: Pulmonary effort is normal. No respiratory distress.      Breath sounds: Normal breath sounds. No wheezing or rales.   Musculoskeletal:      Cervical back: Normal range of motion and neck supple. No tenderness.   Lymphadenopathy:      Cervical: No cervical adenopathy.   Skin:     General: Skin is warm and dry.      Findings: No rash.   Neurological:      Mental Status: She is alert.         Assessment/Plan:   Assessment    1. Strep pharyngitis  - POCT CEPHEID GROUP A STREP - PCR  - amoxicillin (AMOXIL) 500 MG Cap; Take 1 Capsule by mouth 2 times a day for 10 days.  Dispense: 20 Capsule; Refill: 0    2. Exposure to strep throat  - POCT CEPHEID GROUP A STREP - PCR  - amoxicillin (AMOXIL) 500 MG Cap; Take 1 Capsule by mouth 2 times a day for 10 days.  Dispense: 20 Capsule; Refill: 0    3. Chronic nasal congestion  - azelastine (ASTELIN) 137 MCG/SPRAY nasal spray; Administer 1 Spray into affected nostril(S) 2 times a day.  Dispense: 30 mL; Refill: 0    Patient with strep pharyngitis.  Treat with amoxicillin.  Also has chronic nasal congestion and recommended trial of azelastine nasal spray.  She plans to get into an ENT for follow-up for chronic nasal problems.

## 2024-01-22 ENCOUNTER — TELEMEDICINE (OUTPATIENT)
Dept: TELEHEALTH | Facility: TELEMEDICINE | Age: 34
End: 2024-01-22
Payer: COMMERCIAL

## 2024-01-22 DIAGNOSIS — N89.8 VAGINAL DISCHARGE: ICD-10-CM

## 2024-01-22 PROCEDURE — 99213 OFFICE O/P EST LOW 20 MIN: CPT | Performed by: FAMILY MEDICINE

## 2024-01-22 RX ORDER — FLUCONAZOLE 150 MG/1
150 TABLET ORAL DAILY
Qty: 2 TABLET | Refills: 0 | Status: SHIPPED | OUTPATIENT
Start: 2024-01-22

## 2024-01-22 RX ORDER — METRONIDAZOLE 500 MG/1
500 TABLET ORAL EVERY 12 HOURS
Qty: 14 TABLET | Refills: 0 | Status: SHIPPED | OUTPATIENT
Start: 2024-01-22 | End: 2024-01-29

## 2024-01-22 NOTE — PROGRESS NOTES
Virtual Visit: Established Patient   This visit was conducted via Zoom using secure and encrypted videoconferencing technology.   The patient was in a private location outside of their home in the state of [place].    The patient's identity was confirmed and verbal consent was obtained for this virtual visit.     Subjective:   CC: BV    Sveta Jj is a 33 y.o. female presenting for evaluation and management of:    History of BV with 2-3 days progressively worse thin vaginal discharge with change in odor. No rash. No vaginal sores. No concern for STI. She also has PMH candida vaginitis and his been on amoxicillin recently for strep throat. No other aggravating or alleviating factors.        ROS   As above    Current medicines (including changes today)  Current Outpatient Medications   Medication Sig Dispense Refill    metroNIDAZOLE (FLAGYL) 500 MG Tab Take 1 Tablet by mouth every 12 hours for 7 days. 14 Tablet 0    fluconazole (DIFLUCAN) 150 MG tablet Take 1 Tablet by mouth every day. 2 Tablet 0    azelastine (ASTELIN) 137 MCG/SPRAY nasal spray Administer 1 Spray into affected nostril(S) 2 times a day. 30 mL 0    LORazepam (ATIVAN) 1 MG Tab TAKE 1 TABLET BY MOUTH ONCE A DAY AS NEEDED (FOR ACUTE PANIC EPISODES)      amphetamine-dextroamphetamine (ADDERALL) 15 MG tablet Take 1 Tablet by mouth 2 times a day.       No current facility-administered medications for this visit.       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    Physical Exam:  Constitutional: Alert, no distress, well-groomed.  Skin: No rashes in visible areas.  Eye: Round. Conjunctiva clear, lids normal. No icterus.   ENMT: Lips pink without lesions, good dentition, moist mucous membranes. Phonation normal.  Neck: No masses, no  thyromegaly. Moves freely without pain.  Respiratory: Unlabored respiratory effort, no cough or audible wheeze  Psych: Alert and oriented x3, normal affect and mood.     Assessment and Plan:   UC visit 1/8/2024 reviewd  Telemedicine visit 9/27/2023 reviewed    The following treatment plan was discussed:     1. Vaginal discharge  - metroNIDAZOLE (FLAGYL) 500 MG Tab; Take 1 Tablet by mouth every 12 hours for 7 days.  Dispense: 14 Tablet; Refill: 0  - fluconazole (DIFLUCAN) 150 MG tablet; Take 1 Tablet by mouth every day.  Dispense: 2 Tablet; Refill: 0    Suspect BV. She will take metronidazole. If no improvement or symptoms more consistent with yeast infection then diflucan.     Follow-up: No follow-ups on file.

## 2024-06-04 ENCOUNTER — OFFICE VISIT (OUTPATIENT)
Dept: URGENT CARE | Facility: PHYSICIAN GROUP | Age: 34
End: 2024-06-04
Payer: COMMERCIAL

## 2024-06-04 VITALS
SYSTOLIC BLOOD PRESSURE: 102 MMHG | HEART RATE: 80 BPM | WEIGHT: 128 LBS | OXYGEN SATURATION: 96 % | DIASTOLIC BLOOD PRESSURE: 58 MMHG | TEMPERATURE: 98.1 F | BODY MASS INDEX: 22.68 KG/M2 | RESPIRATION RATE: 16 BRPM | HEIGHT: 63 IN

## 2024-06-04 DIAGNOSIS — J02.9 VIRAL PHARYNGITIS: ICD-10-CM

## 2024-06-04 LAB — S PYO DNA SPEC NAA+PROBE: NOT DETECTED

## 2024-06-04 PROCEDURE — 3078F DIAST BP <80 MM HG: CPT | Performed by: STUDENT IN AN ORGANIZED HEALTH CARE EDUCATION/TRAINING PROGRAM

## 2024-06-04 PROCEDURE — 3074F SYST BP LT 130 MM HG: CPT | Performed by: STUDENT IN AN ORGANIZED HEALTH CARE EDUCATION/TRAINING PROGRAM

## 2024-06-04 PROCEDURE — 87651 STREP A DNA AMP PROBE: CPT | Performed by: STUDENT IN AN ORGANIZED HEALTH CARE EDUCATION/TRAINING PROGRAM

## 2024-06-04 PROCEDURE — 99213 OFFICE O/P EST LOW 20 MIN: CPT | Performed by: STUDENT IN AN ORGANIZED HEALTH CARE EDUCATION/TRAINING PROGRAM

## 2024-06-04 ASSESSMENT — FIBROSIS 4 INDEX: FIB4 SCORE: 0.75

## 2024-06-05 NOTE — PROGRESS NOTES
Subjective:   CHIEF COMPLAINT  Chief Complaint   Patient presents with    Sore Throat     States that she has white spots on her throat and feeling fatigue and is concern of strep and (R) ear pain x 3 days       HPI  Sveta Jj is a 34 y.o. female who presents with a chief complaint of a sore throat x 24 hours.  Says she noticed white spots in the back of her throat.  3 days ago developed right ear pain.  She has tried ibuprofen and Tylenol with limited relief of symptoms.  Positive ROS for intermittent, chronic cough; no change in baseline.  She has not had any fevers.  No nausea or vomiting.  No one at home with similar symptoms.  Patient is a dental assistant.    REVIEW OF SYSTEMS  General: no fever or chills  GI: no nausea or vomiting  See HPI for further details.    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    ALLERGIES  Allergies   Allergen Reactions    Sulfa Drugs Hives       CURRENT MEDICATIONS  Home Medications       Reviewed by Mingo Walker D.O. (Physician) on 06/04/24 at 1858  Med List Status: <None>     Medication Last Dose Status   amphetamine-dextroamphetamine (ADDERALL) 15 MG tablet Taking Active   azelastine (ASTELIN) 137 MCG/SPRAY nasal spray Not Taking Active   fluconazole (DIFLUCAN) 150 MG tablet Not Taking Active   LORazepam (ATIVAN) 1 MG Tab Not Taking Active                    SOCIAL HISTORY  Social History     Tobacco Use    Smoking status: Never    Smokeless tobacco: Never   Vaping Use    Vaping status: Never Used   Substance and Sexual Activity    Alcohol use: No    Drug use: No    Sexual activity: Not on file       FAMILY HISTORY  No family history on file.       Objective:   PHYSICAL  "EXAM  VITAL SIGNS: /58 (BP Location: Right arm, Patient Position: Sitting, BP Cuff Size: Adult)   Pulse 80   Temp 36.7 °C (98.1 °F) (Temporal)   Resp 16   Ht 1.6 m (5' 3\")   Wt 58.1 kg (128 lb)   SpO2 96%   BMI 22.67 kg/m²     Gen: no acute distress, normal voice  Skin: dry, intact, moist mucosal membranes  Eyes: No conjunctival injection b/l  Neck: Normal range of motion. No meningeal signs.   ENT: Mild posterior oropharyngeal erythema without exudates.  Uvula midline.  TMs clear intact bilaterally without bulging, erythema or effusion.  Lungs: No increased work of breathing.  CTAB w/ symmetric expansion  CV: RRR w/o murmurs or clicks  Psych: normal affect, normal judgement, alert, awake    POC Strep (cepheid - PCR): negative      Assessment/Plan:     1. Viral pharyngitis  POCT CEPHEID GROUP A STREP - PCR      Signs and symptoms are consistent with a viral respiratory infection and should be self-limiting.  Recommended symptomatic treatment including Motrin, Tylenol, relative rest and fluid hydration. Return to urgent care any new/worsening symptoms or further questions or concerns.  Patient understood everything discussed.  All questions were answered.        Differential diagnosis and supportive care discussed. Follow-up as needed if symptoms worsen or fail to improve to PCP, Urgent care or Emergency Room.    Please note that this dictation was created using voice recognition software. I have made a reasonable attempt to correct obvious errors, but I expect that there are errors of grammar and possibly content that I did not discover before finalizing the note.         "

## 2024-06-18 ENCOUNTER — APPOINTMENT (OUTPATIENT)
Dept: MEDICAL GROUP | Facility: PHYSICIAN GROUP | Age: 34
End: 2024-06-18
Payer: COMMERCIAL

## 2024-07-17 ENCOUNTER — APPOINTMENT (OUTPATIENT)
Dept: MEDICAL GROUP | Facility: PHYSICIAN GROUP | Age: 34
End: 2024-07-17
Payer: COMMERCIAL

## 2024-07-17 ENCOUNTER — HOSPITAL ENCOUNTER (OUTPATIENT)
Facility: MEDICAL CENTER | Age: 34
End: 2024-07-17
Payer: COMMERCIAL

## 2024-07-17 VITALS
DIASTOLIC BLOOD PRESSURE: 58 MMHG | HEART RATE: 72 BPM | BODY MASS INDEX: 23.23 KG/M2 | SYSTOLIC BLOOD PRESSURE: 104 MMHG | WEIGHT: 131.13 LBS | OXYGEN SATURATION: 98 % | RESPIRATION RATE: 20 BRPM | HEIGHT: 63 IN | TEMPERATURE: 97.5 F

## 2024-07-17 DIAGNOSIS — R35.0 INCREASED URINARY FREQUENCY: ICD-10-CM

## 2024-07-17 DIAGNOSIS — Z11.59 NEED FOR HEPATITIS C SCREENING TEST: ICD-10-CM

## 2024-07-17 DIAGNOSIS — R19.5 DARK STOOLS: ICD-10-CM

## 2024-07-17 DIAGNOSIS — Z13.29 SCREENING FOR ENDOCRINE, METABOLIC AND IMMUNITY DISORDER: ICD-10-CM

## 2024-07-17 DIAGNOSIS — Z13.6 SCREENING FOR CARDIOVASCULAR CONDITION: ICD-10-CM

## 2024-07-17 DIAGNOSIS — Z13.0 SCREENING FOR ENDOCRINE, METABOLIC AND IMMUNITY DISORDER: ICD-10-CM

## 2024-07-17 DIAGNOSIS — R39.15 URGENCY OF URINATION: ICD-10-CM

## 2024-07-17 DIAGNOSIS — F90.2 ATTENTION DEFICIT HYPERACTIVITY DISORDER (ADHD), COMBINED TYPE: ICD-10-CM

## 2024-07-17 DIAGNOSIS — Z13.228 SCREENING FOR ENDOCRINE, METABOLIC AND IMMUNITY DISORDER: ICD-10-CM

## 2024-07-17 DIAGNOSIS — E55.9 VITAMIN D DEFICIENCY: ICD-10-CM

## 2024-07-17 DIAGNOSIS — R10.30 LOWER ABDOMINAL PAIN: ICD-10-CM

## 2024-07-17 DIAGNOSIS — K21.9 GASTROESOPHAGEAL REFLUX DISEASE WITHOUT ESOPHAGITIS: ICD-10-CM

## 2024-07-17 DIAGNOSIS — Z11.3 SCREENING EXAMINATION FOR SEXUALLY TRANSMITTED DISEASE: ICD-10-CM

## 2024-07-17 PROBLEM — R25.2 LEG CRAMPING: Status: RESOLVED | Noted: 2022-02-08 | Resolved: 2024-07-17

## 2024-07-17 LAB
APPEARANCE UR: CLEAR
BILIRUB UR STRIP-MCNC: NEGATIVE MG/DL
COLOR UR AUTO: YELLOW
GLUCOSE UR STRIP.AUTO-MCNC: NEGATIVE MG/DL
KETONES UR STRIP.AUTO-MCNC: NEGATIVE MG/DL
LEUKOCYTE ESTERASE UR QL STRIP.AUTO: NEGATIVE
NITRITE UR QL STRIP.AUTO: NEGATIVE
PH UR STRIP.AUTO: 6.5 [PH] (ref 5–8)
PROT UR QL STRIP: NEGATIVE MG/DL
RBC UR QL AUTO: NEGATIVE
SP GR UR STRIP.AUTO: 1.01
UROBILINOGEN UR STRIP-MCNC: 0.2 MG/DL

## 2024-07-17 PROCEDURE — 81002 URINALYSIS NONAUTO W/O SCOPE: CPT

## 2024-07-17 PROCEDURE — 87591 N.GONORRHOEAE DNA AMP PROB: CPT

## 2024-07-17 PROCEDURE — 3078F DIAST BP <80 MM HG: CPT

## 2024-07-17 PROCEDURE — 3074F SYST BP LT 130 MM HG: CPT

## 2024-07-17 PROCEDURE — 99214 OFFICE O/P EST MOD 30 MIN: CPT

## 2024-07-17 PROCEDURE — 87491 CHLMYD TRACH DNA AMP PROBE: CPT

## 2024-07-17 RX ORDER — DEXTROAMPHETAMINE SACCHARATE, AMPHETAMINE ASPARTATE, DEXTROAMPHETAMINE SULFATE AND AMPHETAMINE SULFATE 5; 5; 5; 5 MG/1; MG/1; MG/1; MG/1
20 TABLET ORAL 2 TIMES DAILY
COMMUNITY
Start: 2024-06-24

## 2024-07-17 ASSESSMENT — ENCOUNTER SYMPTOMS
ABDOMINAL PAIN: 0
CONSTIPATION: 0
HEADACHES: 0
FEVER: 0
HEARTBURN: 1
WEIGHT LOSS: 0
PALPITATIONS: 0
DIZZINESS: 0
VOMITING: 0
NAUSEA: 0
WHEEZING: 0
COUGH: 0
TINGLING: 0
BLOOD IN STOOL: 0
SHORTNESS OF BREATH: 0
CHILLS: 0
DIARRHEA: 0
BACK PAIN: 1

## 2024-07-17 ASSESSMENT — FIBROSIS 4 INDEX: FIB4 SCORE: 0.75

## 2024-07-17 ASSESSMENT — PATIENT HEALTH QUESTIONNAIRE - PHQ9: CLINICAL INTERPRETATION OF PHQ2 SCORE: 0

## 2024-07-18 LAB
C TRACH DNA GENITAL QL NAA+PROBE: NEGATIVE
N GONORRHOEA DNA GENITAL QL NAA+PROBE: NEGATIVE
SPECIMEN SOURCE: NORMAL

## 2024-07-23 PROBLEM — H02.9 EYELID ANOMALY: Status: RESOLVED | Noted: 2020-11-24 | Resolved: 2024-07-23

## 2024-07-23 PROBLEM — Q10.3 EYELID ANOMALY: Status: RESOLVED | Noted: 2020-11-24 | Resolved: 2024-07-23

## 2024-08-09 ENCOUNTER — HOSPITAL ENCOUNTER (OUTPATIENT)
Dept: LAB | Facility: MEDICAL CENTER | Age: 34
End: 2024-08-09
Payer: COMMERCIAL

## 2024-08-09 DIAGNOSIS — Z13.0 SCREENING FOR ENDOCRINE, METABOLIC AND IMMUNITY DISORDER: ICD-10-CM

## 2024-08-09 DIAGNOSIS — Z13.228 SCREENING FOR ENDOCRINE, METABOLIC AND IMMUNITY DISORDER: ICD-10-CM

## 2024-08-09 DIAGNOSIS — Z11.3 SCREENING EXAMINATION FOR SEXUALLY TRANSMITTED DISEASE: ICD-10-CM

## 2024-08-09 DIAGNOSIS — Z13.29 SCREENING FOR ENDOCRINE, METABOLIC AND IMMUNITY DISORDER: ICD-10-CM

## 2024-08-09 DIAGNOSIS — Z11.59 NEED FOR HEPATITIS C SCREENING TEST: ICD-10-CM

## 2024-08-09 DIAGNOSIS — E55.9 VITAMIN D DEFICIENCY: ICD-10-CM

## 2024-08-09 DIAGNOSIS — Z13.6 SCREENING FOR CARDIOVASCULAR CONDITION: ICD-10-CM

## 2024-08-09 LAB
ALBUMIN SERPL BCP-MCNC: 4.5 G/DL (ref 3.2–4.9)
ALBUMIN/GLOB SERPL: 1.7 G/DL
ALP SERPL-CCNC: 57 U/L (ref 30–99)
ALT SERPL-CCNC: 16 U/L (ref 2–50)
ANION GAP SERPL CALC-SCNC: 13 MMOL/L (ref 7–16)
AST SERPL-CCNC: 22 U/L (ref 12–45)
BASOPHILS # BLD AUTO: 0.4 % (ref 0–1.8)
BASOPHILS # BLD: 0.02 K/UL (ref 0–0.12)
BILIRUB SERPL-MCNC: 0.3 MG/DL (ref 0.1–1.5)
BUN SERPL-MCNC: 9 MG/DL (ref 8–22)
CALCIUM ALBUM COR SERPL-MCNC: 9.3 MG/DL (ref 8.5–10.5)
CALCIUM SERPL-MCNC: 9.7 MG/DL (ref 8.5–10.5)
CHLORIDE SERPL-SCNC: 101 MMOL/L (ref 96–112)
CHOLEST SERPL-MCNC: 165 MG/DL (ref 100–199)
CO2 SERPL-SCNC: 25 MMOL/L (ref 20–33)
CREAT SERPL-MCNC: 0.69 MG/DL (ref 0.5–1.4)
EOSINOPHIL # BLD AUTO: 0.05 K/UL (ref 0–0.51)
EOSINOPHIL NFR BLD: 1 % (ref 0–6.9)
ERYTHROCYTE [DISTWIDTH] IN BLOOD BY AUTOMATED COUNT: 46.9 FL (ref 35.9–50)
EST. AVERAGE GLUCOSE BLD GHB EST-MCNC: 100 MG/DL
FASTING STATUS PATIENT QL REPORTED: NORMAL
GFR SERPLBLD CREATININE-BSD FMLA CKD-EPI: 116 ML/MIN/1.73 M 2
GLOBULIN SER CALC-MCNC: 2.7 G/DL (ref 1.9–3.5)
GLUCOSE SERPL-MCNC: 92 MG/DL (ref 65–99)
HBA1C MFR BLD: 5.1 % (ref 4–5.6)
HCT VFR BLD AUTO: 48.5 % (ref 37–47)
HDLC SERPL-MCNC: 66 MG/DL
HGB BLD-MCNC: 16.1 G/DL (ref 12–16)
IMM GRANULOCYTES # BLD AUTO: 0.01 K/UL (ref 0–0.11)
IMM GRANULOCYTES NFR BLD AUTO: 0.2 % (ref 0–0.9)
LDLC SERPL CALC-MCNC: 77 MG/DL
LYMPHOCYTES # BLD AUTO: 1.4 K/UL (ref 1–4.8)
LYMPHOCYTES NFR BLD: 27.7 % (ref 22–41)
MCH RBC QN AUTO: 31.6 PG (ref 27–33)
MCHC RBC AUTO-ENTMCNC: 33.2 G/DL (ref 32.2–35.5)
MCV RBC AUTO: 95.3 FL (ref 81.4–97.8)
MONOCYTES # BLD AUTO: 0.55 K/UL (ref 0–0.85)
MONOCYTES NFR BLD AUTO: 10.9 % (ref 0–13.4)
NEUTROPHILS # BLD AUTO: 3.02 K/UL (ref 1.82–7.42)
NEUTROPHILS NFR BLD: 59.8 % (ref 44–72)
NRBC # BLD AUTO: 0 K/UL
NRBC BLD-RTO: 0 /100 WBC (ref 0–0.2)
PLATELET # BLD AUTO: 230 K/UL (ref 164–446)
PMV BLD AUTO: 10.5 FL (ref 9–12.9)
POTASSIUM SERPL-SCNC: 4.4 MMOL/L (ref 3.6–5.5)
PROT SERPL-MCNC: 7.2 G/DL (ref 6–8.2)
RBC # BLD AUTO: 5.09 M/UL (ref 4.2–5.4)
SODIUM SERPL-SCNC: 139 MMOL/L (ref 135–145)
TRIGL SERPL-MCNC: 111 MG/DL (ref 0–149)
WBC # BLD AUTO: 5.1 K/UL (ref 4.8–10.8)

## 2024-08-09 PROCEDURE — 86803 HEPATITIS C AB TEST: CPT

## 2024-08-09 PROCEDURE — 86780 TREPONEMA PALLIDUM: CPT

## 2024-08-09 PROCEDURE — 85025 COMPLETE CBC W/AUTO DIFF WBC: CPT

## 2024-08-09 PROCEDURE — 83036 HEMOGLOBIN GLYCOSYLATED A1C: CPT

## 2024-08-09 PROCEDURE — 84443 ASSAY THYROID STIM HORMONE: CPT

## 2024-08-09 PROCEDURE — 87529 HSV DNA AMP PROBE: CPT | Mod: 91

## 2024-08-09 PROCEDURE — 80061 LIPID PANEL: CPT

## 2024-08-09 PROCEDURE — 36415 COLL VENOUS BLD VENIPUNCTURE: CPT

## 2024-08-09 PROCEDURE — 82306 VITAMIN D 25 HYDROXY: CPT

## 2024-08-09 PROCEDURE — 87389 HIV-1 AG W/HIV-1&-2 AB AG IA: CPT

## 2024-08-09 PROCEDURE — 80053 COMPREHEN METABOLIC PANEL: CPT

## 2024-08-10 LAB
25(OH)D3 SERPL-MCNC: 47 NG/ML (ref 30–100)
HCV AB SER QL: NORMAL
HIV 1+2 AB+HIV1 P24 AG SERPL QL IA: NORMAL
T PALLIDUM AB SER QL IA: NORMAL
TSH SERPL DL<=0.005 MIU/L-ACNC: 1.31 UIU/ML (ref 0.38–5.33)

## 2024-08-13 LAB
HSV1 DNA CSF QL NAA+PROBE: NOT DETECTED
HSV2 DNA CSF QL NAA+PROBE: NOT DETECTED
SPECIMEN SOURCE: NORMAL

## 2024-08-22 ENCOUNTER — HOSPITAL ENCOUNTER (OUTPATIENT)
Facility: MEDICAL CENTER | Age: 34
End: 2024-08-22
Payer: COMMERCIAL

## 2024-08-22 DIAGNOSIS — R10.30 LOWER ABDOMINAL PAIN: ICD-10-CM

## 2024-08-22 DIAGNOSIS — K21.9 GASTROESOPHAGEAL REFLUX DISEASE WITHOUT ESOPHAGITIS: ICD-10-CM

## 2024-08-22 DIAGNOSIS — R19.5 DARK STOOLS: ICD-10-CM

## 2024-08-22 LAB
H PYLORI AG STL QL IA: NOT DETECTED
HEMOCCULT STL QL: NEGATIVE

## 2024-08-22 PROCEDURE — 87338 HPYLORI STOOL AG IA: CPT

## 2024-08-22 PROCEDURE — 82272 OCCULT BLD FECES 1-3 TESTS: CPT

## 2024-08-23 ENCOUNTER — OFFICE VISIT (OUTPATIENT)
Dept: MEDICAL GROUP | Facility: PHYSICIAN GROUP | Age: 34
End: 2024-08-23
Payer: COMMERCIAL

## 2024-08-23 VITALS
OXYGEN SATURATION: 97 % | WEIGHT: 128.8 LBS | BODY MASS INDEX: 22.82 KG/M2 | SYSTOLIC BLOOD PRESSURE: 80 MMHG | HEART RATE: 76 BPM | DIASTOLIC BLOOD PRESSURE: 50 MMHG | HEIGHT: 63 IN | RESPIRATION RATE: 16 BRPM | TEMPERATURE: 97.6 F

## 2024-08-23 DIAGNOSIS — Z13.79 GENETIC SCREENING: ICD-10-CM

## 2024-08-23 DIAGNOSIS — L81.4 SOLAR LENTIGO: ICD-10-CM

## 2024-08-23 DIAGNOSIS — R21 RASH AND NONSPECIFIC SKIN ERUPTION: ICD-10-CM

## 2024-08-23 PROBLEM — R19.5 DARK STOOLS: Status: RESOLVED | Noted: 2024-07-17 | Resolved: 2024-08-23

## 2024-08-23 PROCEDURE — 3078F DIAST BP <80 MM HG: CPT

## 2024-08-23 PROCEDURE — 99213 OFFICE O/P EST LOW 20 MIN: CPT

## 2024-08-23 PROCEDURE — 3074F SYST BP LT 130 MM HG: CPT

## 2024-08-23 ASSESSMENT — FIBROSIS 4 INDEX: FIB4 SCORE: 0.84

## 2024-08-23 NOTE — PROGRESS NOTES
"Verbal consent was acquired by the patient to use [employer] ambient listening note generation during this visit Yes     Subjective:     Chief Complaint   Patient presents with    Lab Results    Referral Needed     To derm for spots on her back.      History of Present Illness  The patient is a 34-year-old established female here to discuss lab work and a possible referral for dermatology.    She has noticed small patches and circles on her back, which seem to worsen during the summer months. Additionally, she has a small white patch on her skin to her abdomen and has identified what appears to be an age spot. Although her daughter has a history of eczema, she herself has never been diagnosed with the condition. She recalls not taking adequate sun protection measures in her childhood but is now more cautious about sun exposure. She has expressed interest in seeing a dermatologist at the [facility].    She has undergone genetic testing through [address] [place] [address] and is awaiting the results. Given her family history of breast cancer, she is interested in undergoing genetic testing for BRCA1 and BRCA2 mutations.  Allergies: Sulfa drugs  ROS per HPI  Health Maintenance: Deferred at this time   Objective:     BP (!) 80/50 (BP Location: Left arm, Patient Position: Sitting, BP Cuff Size: Adult) Comment: 88/68  Pulse 76   Temp 36.4 °C (97.6 °F) (Temporal)   Resp 16   Ht 1.6 m (5' 3\")   Wt 58.4 kg (128 lb 12.8 oz)   LMP 08/19/2024   SpO2 97%   Breastfeeding No   BMI 22.82 kg/m²  Body mass index is 22.82 kg/m².     Physical Exam  Vitals reviewed.   Constitutional:       General: She is not in acute distress.     Appearance: Normal appearance. She is not ill-appearing.   Cardiovascular:      Rate and Rhythm: Normal rate and regular rhythm.   Pulmonary:      Effort: Pulmonary effort is normal. No respiratory distress.   Skin:     Coloration: Skin is not jaundiced or pale.   Neurological:      General: No focal deficit " present.      Mental Status: She is alert and oriented to person, place, and time.   Psychiatric:         Mood and Affect: Mood normal.         Behavior: Behavior normal.         Thought Content: Thought content normal.         Judgment: Judgment normal.        Results  Laboratory Studies  Stool test came back negative. Hemoglobin slightly elevated at 16.1. Cholesterol levels were normal. Kidney and liver function within normal range. Fasting glucose was fine. All screenings for STI came back negative. Thyroid function normal. Vitamin D level is 47.    Assessment and Plan:     The following treatment plan was discussed through shared decision making with the patient:    1. Solar lentigo  Referral to Dermatology      2. Rash and nonspecific skin eruption  Referral to Dermatology      3. Genetic screening  Referral to Genetic Research Studies        Assessment & Plan  She has multiple skin lesions, including solar lentigos and patches that resemble eczema. The lighter patches may be due to slight discoloration in the skin, potentially from sun exposure. A referral to a dermatologist at the [place] location will be made.    Her stool test results were negative, indicating that the previous blood occurrence was likely an isolated incident and has since resolved. The rest of her blood count, chemistry panel, electrolytes, fasting glucose, kidney and liver function, cholesterol, thyroid, and vitamin D levels were all within normal ranges. Her STI screening results were also negative. She was informed that annual lab work is not necessary at her age, but STI screening can be done annually. She was also advised to consider genetic testing for BRCA1 and BRCA2 mutations, Hussein syndrome, and familial hypercholesterolemia, given her family history of breast cancer. A referral for genetic testing will be made.    No follow-ups on file.         Please note that this note was created using dictation with voice recognition software.  I have made every reasonable attempt to correct obvious errors, but I expect that there are errors of grammar and possibly content that I did not discover before finalizing the note.    YULIYA Solares  [facility]  [facility]

## 2024-10-04 ENCOUNTER — APPOINTMENT (RX ONLY)
Dept: URBAN - METROPOLITAN AREA CLINIC 4 | Facility: CLINIC | Age: 34
Setting detail: DERMATOLOGY
End: 2024-10-04

## 2024-10-04 DIAGNOSIS — L30.0 NUMMULAR DERMATITIS: ICD-10-CM

## 2024-10-04 DIAGNOSIS — I78.8 OTHER DISEASES OF CAPILLARIES: ICD-10-CM

## 2024-10-04 PROBLEM — L30.9 DERMATITIS, UNSPECIFIED: Status: ACTIVE | Noted: 2024-10-04

## 2024-10-04 PROCEDURE — ? COUNSELING

## 2024-10-04 PROCEDURE — ? ADDITIONAL NOTES

## 2024-10-04 PROCEDURE — ? PHOTO-DOCUMENTATION

## 2024-10-04 PROCEDURE — 99203 OFFICE O/P NEW LOW 30 MIN: CPT

## 2024-10-04 PROCEDURE — ? PRESCRIPTION

## 2024-10-04 RX ORDER — TRIAMCINOLONE ACETONIDE 1 MG/G
CREAM TOPICAL BID
Qty: 30 | Refills: 0 | Status: ERX | COMMUNITY
Start: 2024-10-04

## 2024-10-04 RX ADMIN — TRIAMCINOLONE ACETONIDE: 1 CREAM TOPICAL at 00:00

## 2024-10-04 ASSESSMENT — LOCATION ZONE DERM
LOCATION ZONE: FACE
LOCATION ZONE: TRUNK

## 2024-10-04 ASSESSMENT — LOCATION SIMPLE DESCRIPTION DERM
LOCATION SIMPLE: UPPER BACK
LOCATION SIMPLE: LEFT CHEEK
LOCATION SIMPLE: ABDOMEN

## 2024-10-04 ASSESSMENT — LOCATION DETAILED DESCRIPTION DERM
LOCATION DETAILED: LEFT CENTRAL MALAR CHEEK
LOCATION DETAILED: EPIGASTRIC SKIN
LOCATION DETAILED: INFERIOR THORACIC SPINE

## 2024-10-04 NOTE — PROCEDURE: ADDITIONAL NOTES
Detail Level: Simple
Additional Notes: - KOH
Render Risk Assessment In Note?: no
Additional Notes: No overlying scale, will continue to monitor. No atypia by dermoscopy warranting biopsy at this time.

## 2024-10-04 NOTE — PROCEDURE: PHOTO-DOCUMENTATION
Photo Preface (Leave Blank If You Do Not Want): Photographs were obtained today on the back.
Detail Level: Zone

## 2024-10-07 ENCOUNTER — HOSPITAL ENCOUNTER (OUTPATIENT)
Dept: LAB | Facility: MEDICAL CENTER | Age: 34
End: 2024-10-07
Payer: COMMERCIAL

## 2024-10-07 ENCOUNTER — OFFICE VISIT (OUTPATIENT)
Dept: MEDICAL GROUP | Facility: PHYSICIAN GROUP | Age: 34
End: 2024-10-07
Payer: COMMERCIAL

## 2024-10-07 ENCOUNTER — HOSPITAL ENCOUNTER (OUTPATIENT)
Facility: MEDICAL CENTER | Age: 34
End: 2024-10-07
Attending: INTERNAL MEDICINE
Payer: COMMERCIAL

## 2024-10-07 VITALS
DIASTOLIC BLOOD PRESSURE: 60 MMHG | HEART RATE: 85 BPM | TEMPERATURE: 97 F | OXYGEN SATURATION: 98 % | HEIGHT: 63 IN | RESPIRATION RATE: 16 BRPM | SYSTOLIC BLOOD PRESSURE: 100 MMHG | WEIGHT: 125 LBS | BODY MASS INDEX: 22.15 KG/M2

## 2024-10-07 DIAGNOSIS — Z11.3 SCREENING EXAMINATION FOR SEXUALLY TRANSMITTED DISEASE: ICD-10-CM

## 2024-10-07 DIAGNOSIS — R30.0 DYSURIA: ICD-10-CM

## 2024-10-07 DIAGNOSIS — F90.2 ATTENTION DEFICIT HYPERACTIVITY DISORDER (ADHD), COMBINED TYPE: ICD-10-CM

## 2024-10-07 PROBLEM — R10.30 LOWER ABDOMINAL PAIN: Status: RESOLVED | Noted: 2024-07-17 | Resolved: 2024-10-07

## 2024-10-07 PROBLEM — R39.15 URGENCY OF URINATION: Status: RESOLVED | Noted: 2024-07-17 | Resolved: 2024-10-07

## 2024-10-07 LAB
APPEARANCE UR: CLEAR
BILIRUB UR STRIP-MCNC: NEGATIVE MG/DL
COLOR UR AUTO: YELLOW
GLUCOSE UR STRIP.AUTO-MCNC: NEGATIVE MG/DL
KETONES UR STRIP.AUTO-MCNC: NEGATIVE MG/DL
LEUKOCYTE ESTERASE UR QL STRIP.AUTO: NEGATIVE
NITRITE UR QL STRIP.AUTO: NEGATIVE
PH UR STRIP.AUTO: 6 [PH] (ref 5–8)
POCT INT CON NEG: NEGATIVE
POCT INT CON POS: POSITIVE
POCT URINE PREGNANCY TEST: NEGATIVE
PROT UR QL STRIP: NEGATIVE MG/DL
RBC UR QL AUTO: NEGATIVE
SP GR UR STRIP.AUTO: 1.01
UROBILINOGEN UR STRIP-MCNC: 0.2 MG/DL

## 2024-10-07 PROCEDURE — 87491 CHLMYD TRACH DNA AMP PROBE: CPT

## 2024-10-07 PROCEDURE — 87086 URINE CULTURE/COLONY COUNT: CPT

## 2024-10-07 PROCEDURE — 87591 N.GONORRHOEAE DNA AMP PROB: CPT

## 2024-10-07 PROCEDURE — 86780 TREPONEMA PALLIDUM: CPT

## 2024-10-07 PROCEDURE — 81025 URINE PREGNANCY TEST: CPT | Performed by: INTERNAL MEDICINE

## 2024-10-07 PROCEDURE — 36415 COLL VENOUS BLD VENIPUNCTURE: CPT

## 2024-10-07 PROCEDURE — 87529 HSV DNA AMP PROBE: CPT | Mod: 91

## 2024-10-07 PROCEDURE — 86803 HEPATITIS C AB TEST: CPT

## 2024-10-07 PROCEDURE — 81002 URINALYSIS NONAUTO W/O SCOPE: CPT | Performed by: INTERNAL MEDICINE

## 2024-10-07 PROCEDURE — 3078F DIAST BP <80 MM HG: CPT | Performed by: INTERNAL MEDICINE

## 2024-10-07 PROCEDURE — 3074F SYST BP LT 130 MM HG: CPT | Performed by: INTERNAL MEDICINE

## 2024-10-07 PROCEDURE — 99213 OFFICE O/P EST LOW 20 MIN: CPT | Performed by: INTERNAL MEDICINE

## 2024-10-07 PROCEDURE — 87389 HIV-1 AG W/HIV-1&-2 AB AG IA: CPT

## 2024-10-07 RX ORDER — PHENAZOPYRIDINE HYDROCHLORIDE 200 MG/1
200 TABLET, FILM COATED ORAL 2 TIMES DAILY PRN
Qty: 20 TABLET | Refills: 0 | Status: SHIPPED | OUTPATIENT
Start: 2024-10-07

## 2024-10-07 ASSESSMENT — FIBROSIS 4 INDEX: FIB4 SCORE: 0.84

## 2024-10-08 LAB
HCV AB SER QL: NORMAL
HIV 1+2 AB+HIV1 P24 AG SERPL QL IA: NORMAL
T PALLIDUM AB SER QL IA: NORMAL

## 2024-10-10 LAB
BACTERIA UR CULT: NORMAL
C TRACH DNA SPEC QL NAA+PROBE: NEGATIVE
N GONORRHOEA DNA SPEC QL NAA+PROBE: NEGATIVE
SIGNIFICANT IND 70042: NORMAL
SITE SITE: NORMAL
SOURCE SOURCE: NORMAL
SPEC CONTAINER SPEC: NORMAL
SPECIMEN SOURCE: NORMAL

## 2024-12-16 ENCOUNTER — TELEMEDICINE (OUTPATIENT)
Dept: TELEHEALTH | Facility: TELEMEDICINE | Age: 34
End: 2024-12-16
Payer: COMMERCIAL

## 2024-12-16 ENCOUNTER — APPOINTMENT (OUTPATIENT)
Dept: TELEHEALTH | Facility: TELEMEDICINE | Age: 34
End: 2024-12-16
Payer: COMMERCIAL

## 2024-12-16 DIAGNOSIS — N76.0 ACUTE VAGINITIS: ICD-10-CM

## 2024-12-16 PROCEDURE — 99213 OFFICE O/P EST LOW 20 MIN: CPT | Mod: 95 | Performed by: NURSE PRACTITIONER

## 2024-12-16 RX ORDER — METRONIDAZOLE 500 MG/1
500 TABLET ORAL 2 TIMES DAILY
Qty: 14 TABLET | Refills: 0 | Status: SHIPPED | OUTPATIENT
Start: 2024-12-16 | End: 2024-12-23

## 2024-12-16 RX ORDER — FLUCONAZOLE 150 MG/1
150 TABLET ORAL ONCE
Qty: 1 TABLET | Refills: 0 | Status: SHIPPED | OUTPATIENT
Start: 2024-12-16 | End: 2024-12-16

## 2024-12-16 ASSESSMENT — ENCOUNTER SYMPTOMS: VAGINITIS: 1

## 2024-12-17 NOTE — PROGRESS NOTES
Subjective:     Sveta Jj is a 34 y.o. female who presents for No chief complaint on file.       NOTE: This encounter was conducted via [employer] and with secure and encrypted videoconferencing equipment. The patient was in a private location in the [facility]. The patient's identity was confirmed and verbal consent was obtained to proceed with this virtual visit.    Vaginitis  The patient's primary symptoms include a genital odor and vaginal discharge. The patient's pertinent negatives include no genital lesions. This is a new problem. Episode onset: 3 days.     Patient reports previous history of BV.  Reports similar symptoms.    Off period now.    Review of Systems   Genitourinary:  Positive for vaginal discharge.     Additional details per HPI.     PMH:  has a past medical history of Dark stools (07/17/2024) and Eyelid anomaly (11/24/2020).    MEDS:   Current Outpatient Medications:     metroNIDAZOLE (FLAGYL) 500 MG Tab, Take 1 Tablet by mouth 2 times a day for 7 days., Disp: 14 Tablet, Rfl: 0    fluconazole (DIFLUCAN) 150 MG tablet, Take 1 Tablet by mouth one time for 1 dose., Disp: 1 Tablet, Rfl: 0    phenazopyridine (PYRIDIUM) 200 MG Tab, Take 1 Tablet by mouth 2 times a day as needed for Moderate Pain., Disp: 20 Tablet, Rfl: 0    amphetamine-dextroamphetamine (ADDERALL) 20 MG Tab, Take 20 mg by mouth 2 times a day., Disp: , Rfl:     ALLERGIES:   Allergies   Allergen Reactions    Sulfa Drugs Hives     SURGHX:   Past Surgical History:   Procedure Laterality Date    PRIMARY C SECTION  09/07/2018    Procedure: PRIMARY C SECTION;  Surgeon: Braeden Blas M.D.;  Location: LABOR AND DELIVERY;  Service: Labor and Delivery    OTHER  2016    breast augmentation     SOCHX:  reports that she has never smoked. She has never used smokeless tobacco. She reports current alcohol use. She reports that she does not use drugs.     FH: Reviewed with patient, not pertinent to this visit.      Objective:      NOTE: Virtual encounter. Physical exam limited. Vital signs not performed.    Physical Exam  Constitutional:       Comments: Sounds non-distressed   HENT:      Mouth/Throat:      Comments: Phonation normal  Pulmonary:      Comments: No cough or audible wheeze, speaks in full sentences  Neurological:      Mental Status: She is alert and oriented to person, place, and time.   Psychiatric:         Mood and Affect: Mood normal.         Speech: Speech normal.       Assessment/Plan:     1. Acute vaginitis  - metroNIDAZOLE (FLAGYL) 500 MG Tab; Take 1 Tablet by mouth 2 times a day for 7 days.  Dispense: 14 Tablet; Refill: 0  - fluconazole (DIFLUCAN) 150 MG tablet; Take 1 Tablet by mouth one time for 1 dose.  Dispense: 1 Tablet; Refill: 0    Rx as above sent electronically.    Monitor. Warning signs reviewed. Return precautions advised.     Differential diagnosis, natural history, supportive care, over-the-counter symptom management per 's instructions, close monitoring, and indications for immediate follow-up discussed.     All questions answered. Patient agrees with the plan of care.    Discharge summary provided via [facility].

## 2025-04-03 ENCOUNTER — APPOINTMENT (OUTPATIENT)
Dept: RADIOLOGY | Facility: IMAGING CENTER | Age: 35
End: 2025-04-03
Payer: COMMERCIAL

## 2025-04-03 ENCOUNTER — HOSPITAL ENCOUNTER (OUTPATIENT)
Facility: MEDICAL CENTER | Age: 35
End: 2025-04-03
Payer: COMMERCIAL

## 2025-04-03 ENCOUNTER — OFFICE VISIT (OUTPATIENT)
Dept: URGENT CARE | Facility: CLINIC | Age: 35
End: 2025-04-03
Payer: COMMERCIAL

## 2025-04-03 VITALS
TEMPERATURE: 97.4 F | BODY MASS INDEX: 20.94 KG/M2 | SYSTOLIC BLOOD PRESSURE: 100 MMHG | WEIGHT: 118.2 LBS | HEIGHT: 63 IN | OXYGEN SATURATION: 98 % | HEART RATE: 97 BPM | RESPIRATION RATE: 20 BRPM | DIASTOLIC BLOOD PRESSURE: 56 MMHG

## 2025-04-03 DIAGNOSIS — R06.02 SOB (SHORTNESS OF BREATH): ICD-10-CM

## 2025-04-03 DIAGNOSIS — J01.00 SUBACUTE MAXILLARY SINUSITIS: ICD-10-CM

## 2025-04-03 DIAGNOSIS — F41.9 ANXIETY: ICD-10-CM

## 2025-04-03 DIAGNOSIS — R05.1 ACUTE COUGH: ICD-10-CM

## 2025-04-03 LAB
ALBUMIN SERPL BCP-MCNC: 4.9 G/DL (ref 3.2–4.9)
ALBUMIN/GLOB SERPL: 1.8 G/DL
ALP SERPL-CCNC: 77 U/L (ref 30–99)
ALT SERPL-CCNC: 14 U/L (ref 2–50)
ANION GAP SERPL CALC-SCNC: 14 MMOL/L (ref 7–16)
AST SERPL-CCNC: 19 U/L (ref 12–45)
BASOPHILS # BLD AUTO: 0.4 % (ref 0–1.8)
BASOPHILS # BLD: 0.03 K/UL (ref 0–0.12)
BILIRUB SERPL-MCNC: 0.6 MG/DL (ref 0.1–1.5)
BUN SERPL-MCNC: 10 MG/DL (ref 8–22)
CALCIUM ALBUM COR SERPL-MCNC: 9.3 MG/DL (ref 8.5–10.5)
CALCIUM SERPL-MCNC: 10 MG/DL (ref 8.5–10.5)
CHLORIDE SERPL-SCNC: 101 MMOL/L (ref 96–112)
CO2 SERPL-SCNC: 22 MMOL/L (ref 20–33)
CREAT SERPL-MCNC: 0.76 MG/DL (ref 0.5–1.4)
D DIMER PPP IA.FEU-MCNC: <0.27 UG/ML (FEU) (ref 0–0.5)
EOSINOPHIL # BLD AUTO: 0.14 K/UL (ref 0–0.51)
EOSINOPHIL NFR BLD: 1.9 % (ref 0–6.9)
ERYTHROCYTE [DISTWIDTH] IN BLOOD BY AUTOMATED COUNT: 45.1 FL (ref 35.9–50)
GFR SERPLBLD CREATININE-BSD FMLA CKD-EPI: 104 ML/MIN/1.73 M 2
GLOBULIN SER CALC-MCNC: 2.8 G/DL (ref 1.9–3.5)
GLUCOSE SERPL-MCNC: 93 MG/DL (ref 65–99)
HCT VFR BLD AUTO: 44 % (ref 37–47)
HGB BLD-MCNC: 14.6 G/DL (ref 12–16)
IMM GRANULOCYTES # BLD AUTO: 0.04 K/UL (ref 0–0.11)
IMM GRANULOCYTES NFR BLD AUTO: 0.5 % (ref 0–0.9)
LYMPHOCYTES # BLD AUTO: 1.74 K/UL (ref 1–4.8)
LYMPHOCYTES NFR BLD: 23 % (ref 22–41)
MCH RBC QN AUTO: 30.5 PG (ref 27–33)
MCHC RBC AUTO-ENTMCNC: 33.2 G/DL (ref 32.2–35.5)
MCV RBC AUTO: 92.1 FL (ref 81.4–97.8)
MONOCYTES # BLD AUTO: 0.5 K/UL (ref 0–0.85)
MONOCYTES NFR BLD AUTO: 6.6 % (ref 0–13.4)
NEUTROPHILS # BLD AUTO: 5.11 K/UL (ref 1.82–7.42)
NEUTROPHILS NFR BLD: 67.6 % (ref 44–72)
NRBC # BLD AUTO: 0 K/UL
NRBC BLD-RTO: 0 /100 WBC (ref 0–0.2)
PLATELET # BLD AUTO: 369 K/UL (ref 164–446)
PMV BLD AUTO: 9.5 FL (ref 9–12.9)
POTASSIUM SERPL-SCNC: 4 MMOL/L (ref 3.6–5.5)
PROT SERPL-MCNC: 7.7 G/DL (ref 6–8.2)
RBC # BLD AUTO: 4.78 M/UL (ref 4.2–5.4)
SODIUM SERPL-SCNC: 137 MMOL/L (ref 135–145)
WBC # BLD AUTO: 7.6 K/UL (ref 4.8–10.8)

## 2025-04-03 PROCEDURE — 85379 FIBRIN DEGRADATION QUANT: CPT

## 2025-04-03 PROCEDURE — 36415 COLL VENOUS BLD VENIPUNCTURE: CPT

## 2025-04-03 PROCEDURE — 99214 OFFICE O/P EST MOD 30 MIN: CPT | Mod: 25

## 2025-04-03 PROCEDURE — 80053 COMPREHEN METABOLIC PANEL: CPT

## 2025-04-03 PROCEDURE — 71046 X-RAY EXAM CHEST 2 VIEWS: CPT | Mod: TC,FY | Performed by: RADIOLOGY

## 2025-04-03 PROCEDURE — 85025 COMPLETE CBC W/AUTO DIFF WBC: CPT

## 2025-04-03 PROCEDURE — 94760 N-INVAS EAR/PLS OXIMETRY 1: CPT

## 2025-04-03 RX ORDER — PREDNISONE 20 MG/1
40 TABLET ORAL DAILY
Qty: 10 TABLET | Refills: 0 | Status: SHIPPED | OUTPATIENT
Start: 2025-04-03 | End: 2025-04-08

## 2025-04-03 RX ORDER — AMOXICILLIN 875 MG/1
875 TABLET, COATED ORAL 2 TIMES DAILY
Qty: 14 TABLET | Refills: 0 | Status: SHIPPED | OUTPATIENT
Start: 2025-04-03 | End: 2025-04-10

## 2025-04-03 RX ORDER — ALBUTEROL SULFATE 90 UG/1
2 INHALANT RESPIRATORY (INHALATION) EVERY 4 HOURS PRN
Qty: 1 EACH | Refills: 0 | Status: SHIPPED | OUTPATIENT
Start: 2025-04-03

## 2025-04-03 ASSESSMENT — ENCOUNTER SYMPTOMS
SORE THROAT: 0
WHEEZING: 0
MYALGIAS: 0
ABDOMINAL PAIN: 0
FEVER: 0
SHORTNESS OF BREATH: 1
COUGH: 1
CHILLS: 0

## 2025-04-03 ASSESSMENT — FIBROSIS 4 INDEX: FIB4 SCORE: 0.86

## 2025-04-03 NOTE — PROGRESS NOTES
Subjective:   Chief Complaint  Sveta Jj is a 35 y.o. female who presents for Shortness of Breath (X 4 days, SOB, anxiety, coughing, green mucus, nasal congestion.)      History of Present Illness  Patient presents with complaints of 2 episodes of shortness of breath that happened Sunday night and earlier today while she was at work.  She reports since that episode on Sunday she was feeling better on Monday and Tuesday but then while she was at work she had another episode. She also reports that her daughter was recently diagnosed with influenza towards the end of January and in February it developed into pneumonia which needed treatment.  She states she also recovered from being ill but since then has a had green/yellow sputum.  She reports that she has been trying to take allergy medications which have not helped alleviate symptoms. She states 2 weeks ago she flew to [facility] back.  She states this past weekend she flew to [place] and back and later that night had a single episode of shortness of breath with chest pain and anxiety.          Review of Systems  Review of Systems   Constitutional:  Negative for chills and fever.   HENT:  Positive for congestion. Negative for sore throat.    Respiratory:  Positive for cough and shortness of breath. Negative for wheezing.    Cardiovascular:  Positive for chest pain.   Gastrointestinal:  Negative for abdominal pain.   Musculoskeletal:  Negative for myalgias.   Skin:  Negative for rash.       Past Medical History  Past Medical History:   Diagnosis Date    Dark stools 07/17/2024    Patient had been experiencing dark stools several weeks ago after vomiting up some coffee-ground emesis.  Denies any lightheadedness or dizziness experiencing some increase in urgency of urination and lower abdominal pain.      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       Family History  Family History   Problem Relation Age of Onset    Breast Cancer Maternal Aunt     Hyperlipidemia Maternal  "Grandmother     Hypertension Maternal Grandmother     Diabetes Maternal Grandmother     Hyperlipidemia Maternal Grandfather     Hypertension Maternal Grandfather     Heart Disease Maternal Grandfather         MI    Diabetes Maternal Grandfather     Dementia Maternal Grandfather     Hyperlipidemia Paternal Grandmother     Hypertension Paternal Grandmother     Diabetes Paternal Grandmother     Cancer Paternal Grandmother         BLOOD    Stroke Paternal Grandfather     Hyperlipidemia Paternal Grandfather     Hypertension Paternal Grandfather     Diabetes Paternal Grandfather     Cancer Paternal Grandfather         esophageal    Colorectal Cancer Neg Hx        Social History  Social History     Socioeconomic History    Marital status: Single   Tobacco Use    Smoking status: Never    Smokeless tobacco: Never   Vaping Use    Vaping status: Never Used   Substance and Sexual Activity    Alcohol use: Yes     Comment: 3-4 drinks/ week    Drug use: No    Sexual activity: Yes     Partners: Male     Birth control/protection: None       Surgical History  Past Surgical History:   Procedure Laterality Date    PRIMARY C SECTION  09/07/2018    Procedure: PRIMARY C SECTION;  Surgeon: Braeden Blas M.D.;  Location: LABOR AND DELIVERY;  Service: Labor and Delivery    OTHER  2016    breast augmentation       Current Medications  Home Medications       Reviewed by Narendra Unger Ass't (Medical Assistant) on 04/03/25 at 1109  Med List Status: <None>     Medication Last Dose Status   amphetamine-dextroamphetamine (ADDERALL) 20 MG Tab Taking Active   phenazopyridine (PYRIDIUM) 200 MG Tab Not Taking Active                    Allergies  Allergies   Allergen Reactions    Sulfa Drugs Hives          Objective:     /56   Pulse 97   Temp 36.3 °C (97.4 °F) (Temporal)   Resp 20   Ht 1.6 m (5' 3\")   Wt 53.6 kg (118 lb 3.2 oz)   SpO2 98%     Physical Exam  Constitutional:       Appearance: Normal appearance.   HENT:      " Head: Normocephalic and atraumatic.      Right Ear: Tympanic membrane, ear canal and external ear normal.      Left Ear: Tympanic membrane, ear canal and external ear normal.      Nose: Nose normal.      Mouth/Throat:      Mouth: Mucous membranes are moist.      Pharynx: Oropharynx is clear.   Eyes:      Conjunctiva/sclera: Conjunctivae normal.      Pupils: Pupils are equal, round, and reactive to light.   Cardiovascular:      Rate and Rhythm: Normal rate and regular rhythm.      Pulses: Normal pulses.      Heart sounds: Normal heart sounds.   Pulmonary:      Effort: Pulmonary effort is normal.      Breath sounds: Normal breath sounds.   Abdominal:      General: Abdomen is flat. Bowel sounds are normal.      Palpations: Abdomen is soft.   Skin:     General: Skin is warm and dry.      Capillary Refill: Capillary refill takes less than 2 seconds.   Neurological:      General: No focal deficit present.      Mental Status: She is alert and oriented to person, place, and time.   Psychiatric:         Mood and Affect: Mood normal.         Behavior: Behavior normal.         Assessment/Plan:     Diagnosis  1. Subacute maxillary sinusitis  - predniSONE (DELTASONE) 20 MG Tab; Take 2 Tablets by mouth every day for 5 days.  Dispense: 10 Tablet; Refill: 0  - amoxicillin (AMOXIL) 875 MG tablet; Take 1 Tablet by mouth 2 times a day for 7 days.  Dispense: 14 Tablet; Refill: 0    2. SOB (shortness of breath)  - EKG - Clinic Performed  - DX-CHEST-2 VIEWS; Future  - CBC WITH DIFFERENTIAL  - Comp Metabolic Panel  - D-DIMER  - albuterol 108 (90 Base) MCG/ACT Aero Soln inhalation aerosol; Inhale 2 Puffs every four hours as needed for Shortness of Breath.  Dispense: 1 Each; Refill: 0  - predniSONE (DELTASONE) 20 MG Tab; Take 2 Tablets by mouth every day for 5 days.  Dispense: 10 Tablet; Refill: 0    3. Anxiety  - EKG - Clinic Performed  - D-DIMER    4. Acute cough  - DX-CHEST-2 VIEWS; Future  - predniSONE (DELTASONE) 20 MG Tab; Take 2  Tablets by mouth every day for 5 days.  Dispense: 10 Tablet; Refill: 0        MDM/Plan/Discussion  After having a lengthy conversation in collaborative discussion with the patient she is electing to do blood tests and diagnostic studies at the urgent care.  She was educated that I cannot fully rule out any cardiac or respiratory etiologies without further evaluation and that she should seek medical care at the nearest emergency department if symptoms worsen or symptoms develop.  Given that the patient reports that she has been traveling extensively this past month and that her job requires her to sit down I believed it was reasonable to obtain radiographic, laboratory and diagnostic studies to rule out  acute abnormalities that are concerning for life-threatening conditions.  With the patient's account that she has been sick for over the past month with sinus congestion and that her daughter was also sick and diagnosed with pneumonia we had a discussion about the use of antibiotics and steroids to help with her symptoms and the patient was agreeable to trialing antibiotics for a lingering infection.  She was educated to not skip any antibiotic doses and take the entire prescription even if they are feeling better.           Shared decision-making was utilized with patient for treatment plan. Medication discussed included indication for use and the potential benefits and side effects. Education was provided regarding the aforementioned assessments.  Differential Diagnosis, natural history, and supportive care discussed. All of the patient's questions were answered to their satisfaction at the time of discharge. Patient was encouraged to monitor symptoms closely. Those signs and symptoms which would warrant concern and mandate seeking a higher level of service through the emergency department discussed at length.  Patient stated agreement and understanding of this plan of care.    Advised the patient to follow-up with  the primary care physician for recheck, reevaluation, and consideration of further management.      Please note that this dictation was created using voice recognition software. I have made a reasonable attempt to correct obvious errors, but I expect that there are errors of grammar and possibly content that I did not discover before finalizing the note.    This note was electronically signed by KATHY Marion

## 2025-04-04 ENCOUNTER — APPOINTMENT (OUTPATIENT)
Dept: MEDICAL GROUP | Facility: PHYSICIAN GROUP | Age: 35
End: 2025-04-04
Payer: COMMERCIAL

## 2025-04-18 ENCOUNTER — HOSPITAL ENCOUNTER (OUTPATIENT)
Dept: LAB | Facility: MEDICAL CENTER | Age: 35
End: 2025-04-18
Payer: COMMERCIAL

## 2025-04-18 DIAGNOSIS — Z11.3 SCREENING EXAMINATION FOR SEXUALLY TRANSMITTED DISEASE: ICD-10-CM

## 2025-04-18 PROCEDURE — 36415 COLL VENOUS BLD VENIPUNCTURE: CPT

## 2025-04-18 PROCEDURE — 87389 HIV-1 AG W/HIV-1&-2 AB AG IA: CPT

## 2025-04-18 PROCEDURE — 87491 CHLMYD TRACH DNA AMP PROBE: CPT

## 2025-04-18 PROCEDURE — 86780 TREPONEMA PALLIDUM: CPT

## 2025-04-18 PROCEDURE — 87591 N.GONORRHOEAE DNA AMP PROB: CPT

## 2025-04-18 PROCEDURE — 86803 HEPATITIS C AB TEST: CPT

## 2025-04-18 PROCEDURE — 87529 HSV DNA AMP PROBE: CPT

## 2025-04-19 LAB
HCV AB SER QL: NORMAL
HIV 1+2 AB+HIV1 P24 AG SERPL QL IA: NORMAL
T PALLIDUM AB SER QL IA: NORMAL

## 2025-04-22 LAB
C TRACH DNA SPEC QL NAA+PROBE: NEGATIVE
HSV1 DNA CSF QL NAA+PROBE: NOT DETECTED
HSV2 DNA CSF QL NAA+PROBE: NOT DETECTED
N GONORRHOEA DNA SPEC QL NAA+PROBE: NEGATIVE
SPEC CONTAINER SPEC: NORMAL
SPECIMEN SOURCE: NORMAL
SPECIMEN SOURCE: NORMAL

## 2025-05-13 ENCOUNTER — RESULTS FOLLOW-UP (OUTPATIENT)
Dept: MEDICAL GROUP | Facility: PHYSICIAN GROUP | Age: 35
End: 2025-05-13

## (undated) DEVICE — SHEATH RO 4F 10CM (10EA/BX)

## (undated) DEVICE — CHLORAPREP 26 ML APPLICATOR - ORANGE TINT(25/CA)

## (undated) DEVICE — PACK C-SECTION (2EA/CA)

## (undated) DEVICE — TUBING CLEARLINK DUO-VENT - C-FLO (48EA/CA)

## (undated) DEVICE — CATHETER IV NON-SAFETY 18 GA X 1 1/4 (50/BX 4BX/CA)

## (undated) DEVICE — SODIUM CHL IRRIGATION 0.9% 1000ML (12EA/CA)

## (undated) DEVICE — SLEEVE, SEQUENTIAL CALF REG

## (undated) DEVICE — WATER IRRIG. STER. 1500 ML - (9/CA)

## (undated) DEVICE — SET EXTENSION WITH 2 PORTS (48EA/CA) ***PART #2C8610 IS A SUBSTITUTE*****

## (undated) DEVICE — PACK ROOM TURNOVER L&D (12/CA)

## (undated) DEVICE — KIT  I.V. START (100EA/CA)

## (undated) DEVICE — TRAY SPINAL ANESTHESIA NON-SAFETY (10/CA)

## (undated) DEVICE — CANISTER SUCTION 3000ML MECHANICAL FILTER AUTO SHUTOFF MEDI-VAC NONSTERILE LF DISP  (40EA/CA)

## (undated) DEVICE — TRAY BLADDER CARE W/ 16 FR FOLEY CATHETER STATLOCK  (10/CA)

## (undated) DEVICE — BLANKET UNDERBODY FULL ACCES - (5/CA)

## (undated) DEVICE — PAD GROUNDING PRE-JELLED - (50EA/PK)

## (undated) DEVICE — HEAD HOLDER JUNIOR/ADULT

## (undated) DEVICE — DETERGENT RENUZYME PLUS 10 OZ PACKET (50/BX)

## (undated) DEVICE — TAPE CLOTH MEDIPORE 6 INCH - (12RL/CA)